# Patient Record
Sex: FEMALE | Race: WHITE | URBAN - METROPOLITAN AREA
[De-identification: names, ages, dates, MRNs, and addresses within clinical notes are randomized per-mention and may not be internally consistent; named-entity substitution may affect disease eponyms.]

---

## 2019-01-30 ENCOUNTER — OUTPATIENT (OUTPATIENT)
Dept: OUTPATIENT SERVICES | Facility: HOSPITAL | Age: 79
LOS: 1 days | End: 2019-01-30
Payer: MEDICARE

## 2019-01-30 VITALS
TEMPERATURE: 98 F | HEART RATE: 64 BPM | WEIGHT: 197.98 LBS | RESPIRATION RATE: 18 BRPM | DIASTOLIC BLOOD PRESSURE: 66 MMHG | HEIGHT: 63 IN | SYSTOLIC BLOOD PRESSURE: 130 MMHG

## 2019-01-30 DIAGNOSIS — Z98.890 OTHER SPECIFIED POSTPROCEDURAL STATES: Chronic | ICD-10-CM

## 2019-01-30 DIAGNOSIS — Z90.49 ACQUIRED ABSENCE OF OTHER SPECIFIED PARTS OF DIGESTIVE TRACT: Chronic | ICD-10-CM

## 2019-01-30 DIAGNOSIS — N85.9 NONINFLAMMATORY DISORDER OF UTERUS, UNSPECIFIED: ICD-10-CM

## 2019-01-30 DIAGNOSIS — H40.9 UNSPECIFIED GLAUCOMA: ICD-10-CM

## 2019-01-30 LAB
ANION GAP SERPL CALC-SCNC: 12 MMO/L — SIGNIFICANT CHANGE UP (ref 7–14)
BASOPHILS # BLD AUTO: 0.05 K/UL — SIGNIFICANT CHANGE UP (ref 0–0.2)
BASOPHILS NFR BLD AUTO: 0.7 % — SIGNIFICANT CHANGE UP (ref 0–2)
BUN SERPL-MCNC: 21 MG/DL — SIGNIFICANT CHANGE UP (ref 7–23)
CALCIUM SERPL-MCNC: 9.2 MG/DL — SIGNIFICANT CHANGE UP (ref 8.4–10.5)
CHLORIDE SERPL-SCNC: 103 MMOL/L — SIGNIFICANT CHANGE UP (ref 98–107)
CO2 SERPL-SCNC: 27 MMOL/L — SIGNIFICANT CHANGE UP (ref 22–31)
CREAT SERPL-MCNC: 0.92 MG/DL — SIGNIFICANT CHANGE UP (ref 0.5–1.3)
EOSINOPHIL # BLD AUTO: 0.3 K/UL — SIGNIFICANT CHANGE UP (ref 0–0.5)
EOSINOPHIL NFR BLD AUTO: 4 % — SIGNIFICANT CHANGE UP (ref 0–6)
GLUCOSE SERPL-MCNC: 79 MG/DL — SIGNIFICANT CHANGE UP (ref 70–99)
HCT VFR BLD CALC: 43.1 % — SIGNIFICANT CHANGE UP (ref 34.5–45)
HGB BLD-MCNC: 13.7 G/DL — SIGNIFICANT CHANGE UP (ref 11.5–15.5)
IMM GRANULOCYTES NFR BLD AUTO: 0.5 % — SIGNIFICANT CHANGE UP (ref 0–1.5)
LYMPHOCYTES # BLD AUTO: 1.65 K/UL — SIGNIFICANT CHANGE UP (ref 1–3.3)
LYMPHOCYTES # BLD AUTO: 21.8 % — SIGNIFICANT CHANGE UP (ref 13–44)
MCHC RBC-ENTMCNC: 29.5 PG — SIGNIFICANT CHANGE UP (ref 27–34)
MCHC RBC-ENTMCNC: 31.8 % — LOW (ref 32–36)
MCV RBC AUTO: 92.7 FL — SIGNIFICANT CHANGE UP (ref 80–100)
MONOCYTES # BLD AUTO: 0.63 K/UL — SIGNIFICANT CHANGE UP (ref 0–0.9)
MONOCYTES NFR BLD AUTO: 8.3 % — SIGNIFICANT CHANGE UP (ref 2–14)
NEUTROPHILS # BLD AUTO: 4.9 K/UL — SIGNIFICANT CHANGE UP (ref 1.8–7.4)
NEUTROPHILS NFR BLD AUTO: 64.7 % — SIGNIFICANT CHANGE UP (ref 43–77)
NRBC # FLD: 0 K/UL — LOW (ref 25–125)
PLATELET # BLD AUTO: 222 K/UL — SIGNIFICANT CHANGE UP (ref 150–400)
PMV BLD: 13 FL — SIGNIFICANT CHANGE UP (ref 7–13)
POTASSIUM SERPL-MCNC: 4.1 MMOL/L — SIGNIFICANT CHANGE UP (ref 3.5–5.3)
POTASSIUM SERPL-SCNC: 4.1 MMOL/L — SIGNIFICANT CHANGE UP (ref 3.5–5.3)
RBC # BLD: 4.65 M/UL — SIGNIFICANT CHANGE UP (ref 3.8–5.2)
RBC # FLD: 12.1 % — SIGNIFICANT CHANGE UP (ref 10.3–14.5)
SODIUM SERPL-SCNC: 142 MMOL/L — SIGNIFICANT CHANGE UP (ref 135–145)
WBC # BLD: 7.57 K/UL — SIGNIFICANT CHANGE UP (ref 3.8–10.5)
WBC # FLD AUTO: 7.57 K/UL — SIGNIFICANT CHANGE UP (ref 3.8–10.5)

## 2019-01-30 PROCEDURE — 93010 ELECTROCARDIOGRAM REPORT: CPT

## 2019-01-30 NOTE — H&P PST ADULT - NSANTHOSAYNRD_GEN_A_CORE
No. TIMOTEO screening performed.  STOP BANG Legend: 0-2 = LOW Risk; 3-4 = INTERMEDIATE Risk; 5-8 = HIGH Risk

## 2019-01-30 NOTE — H&P PST ADULT - RS GEN PE MLT RESP DETAILS PC
breath sounds equal/respirations non-labored/good air movement/clear to auscultation bilaterally/airway patent/no wheezes

## 2019-01-30 NOTE — H&P PST ADULT - PROBLEM SELECTOR PLAN 1
scheduled for dilation curettage hysteroscopy, polypectomy on 02/13/2019.   Pre-Op instructions provided to patient.   Pt. will take her own Pantoprazole day of surgery.  Patient will obtain medical clearance as per surgeons request-copy requested

## 2019-01-30 NOTE — H&P PST ADULT - HISTORY OF PRESENT ILLNESS
77 yo female presents to PST unit with pre-op diagnosis of noninflammatory disorder of uterus scheduled for dilation curettage hysteroscopy, polypectomy on 02/13/2019. She states there was fluid in her uterus on office Sono.

## 2019-01-30 NOTE — H&P PST ADULT - ASSESSMENT
79 yo female presents to PST unit with pre-op diagnosis of noninflammatory disorder of uterus scheduled for dilation curettage hysteroscopy, polypectomy on 02/13/2019. She states there was fluid in her uterus on office Sono.

## 2019-02-12 ENCOUNTER — TRANSCRIPTION ENCOUNTER (OUTPATIENT)
Age: 79
End: 2019-02-12

## 2019-02-12 NOTE — ASU PATIENT PROFILE, ADULT - ATTEMPT TO OOB
47 y.o. female presents to ER ED 05/ED 05   Chief Complaint   Patient presents with    Flank Pain     left   . No acute distress noted.  Dr. Alejandro is at the bedside.   no

## 2019-02-13 ENCOUNTER — RESULT REVIEW (OUTPATIENT)
Age: 79
End: 2019-02-13

## 2019-02-13 ENCOUNTER — OUTPATIENT (OUTPATIENT)
Dept: OUTPATIENT SERVICES | Facility: HOSPITAL | Age: 79
LOS: 1 days | Discharge: HOME | End: 2019-02-13
Payer: MEDICARE

## 2019-02-13 VITALS
TEMPERATURE: 98 F | SYSTOLIC BLOOD PRESSURE: 151 MMHG | WEIGHT: 197.98 LBS | HEIGHT: 63 IN | RESPIRATION RATE: 18 BRPM | HEART RATE: 75 BPM | OXYGEN SATURATION: 95 % | DIASTOLIC BLOOD PRESSURE: 59 MMHG

## 2019-02-13 VITALS
OXYGEN SATURATION: 98 % | DIASTOLIC BLOOD PRESSURE: 60 MMHG | SYSTOLIC BLOOD PRESSURE: 128 MMHG | RESPIRATION RATE: 18 BRPM | HEART RATE: 68 BPM

## 2019-02-13 DIAGNOSIS — Z90.49 ACQUIRED ABSENCE OF OTHER SPECIFIED PARTS OF DIGESTIVE TRACT: Chronic | ICD-10-CM

## 2019-02-13 DIAGNOSIS — N85.9 NONINFLAMMATORY DISORDER OF UTERUS, UNSPECIFIED: ICD-10-CM

## 2019-02-13 DIAGNOSIS — Z98.890 OTHER SPECIFIED POSTPROCEDURAL STATES: Chronic | ICD-10-CM

## 2019-02-13 PROCEDURE — 88112 CYTOPATH CELL ENHANCE TECH: CPT | Mod: 26

## 2019-02-13 PROCEDURE — 88305 TISSUE EXAM BY PATHOLOGIST: CPT | Mod: 26

## 2019-02-13 PROCEDURE — 88305 TISSUE EXAM BY PATHOLOGIST: CPT | Mod: 26,59

## 2019-02-13 RX ORDER — FENOFIBRATE,MICRONIZED 130 MG
1 CAPSULE ORAL
Qty: 0 | Refills: 0 | COMMUNITY

## 2019-02-13 RX ORDER — PANTOPRAZOLE SODIUM 20 MG/1
1 TABLET, DELAYED RELEASE ORAL
Qty: 0 | Refills: 0 | COMMUNITY

## 2019-02-13 RX ORDER — MELOXICAM 15 MG/1
1 TABLET ORAL
Qty: 0 | Refills: 0 | COMMUNITY

## 2019-02-13 RX ORDER — ASPIRIN/CALCIUM CARB/MAGNESIUM 324 MG
1 TABLET ORAL
Qty: 0 | Refills: 0 | COMMUNITY

## 2019-02-13 RX ORDER — LATANOPROST 0.05 MG/ML
1 SOLUTION/ DROPS OPHTHALMIC; TOPICAL
Qty: 0 | Refills: 0 | COMMUNITY

## 2019-02-13 RX ORDER — SODIUM CHLORIDE 9 MG/ML
1000 INJECTION, SOLUTION INTRAVENOUS
Qty: 0 | Refills: 0 | Status: DISCONTINUED | OUTPATIENT
Start: 2019-02-13 | End: 2019-02-13

## 2019-02-13 RX ORDER — ATORVASTATIN CALCIUM 80 MG/1
1 TABLET, FILM COATED ORAL
Qty: 0 | Refills: 0 | COMMUNITY

## 2019-02-13 RX ORDER — TIMOLOL 0.5 %
1 DROPS OPHTHALMIC (EYE)
Qty: 0 | Refills: 0 | COMMUNITY

## 2019-02-13 RX ADMIN — SODIUM CHLORIDE 30 MILLILITER(S): 9 INJECTION, SOLUTION INTRAVENOUS at 08:19

## 2019-02-13 NOTE — ASU DISCHARGE PLAN (ADULT/PEDIATRIC). - NOTIFY
Bleeding that does not stop/GYN Fever>100.4 Pain not relieved by Medications/Bleeding that does not stop/Persistent Nausea and Vomiting/GYN Fever>100.4

## 2019-02-13 NOTE — BRIEF OPERATIVE NOTE - POST-OP DX
Fluid in endometrial cavity  02/13/2019    Julia Márquez  Stenosis of cervix  02/13/2019    Julia Márquez

## 2019-02-13 NOTE — BRIEF OPERATIVE NOTE - PROCEDURE
<<-----Click on this checkbox to enter Procedure Hysteroscopy with biopsy, with dilation and curettage of uterus if indicated  02/13/2019    Active  [facility]  Dilation and curettage of uterus  02/13/2019    Active  [facility]

## 2019-02-13 NOTE — ASU DISCHARGE PLAN (ADULT/PEDIATRIC). - MEDICATION SUMMARY - MEDICATIONS TO TAKE
I will START or STAY ON the medications listed below when I get home from the hospital:    aspirin 81 mg oral tablet  -- 1 tab(s) by mouth once a day - last dose 2/6/19  -- Indication: For pain    meloxicam 7.5 mg oral tablet  -- 1 tab(s) by mouth once a day  -- Indication: For pain    fenofibrate 54 mg oral tablet  -- 1 tab(s) by mouth once a day  -- Indication: For cholesterol    atorvastatin 20 mg oral tablet  -- 1 tab(s) by mouth once a day  -- Indication: For cholesterol    timolol hemihydrate 0.5% ophthalmic solution  -- 1 drop(s) to each affected eye 2 times a day  -- Indication: For eye drops    latanoprost 0.005% ophthalmic solution  -- 1 drop(s) to each affected eye once a day (in the evening)  -- Indication: For eye drops    pantoprazole 40 mg oral delayed release tablet  -- 1 tab(s) by mouth once a day  -- Indication: For acid blocker

## 2019-02-13 NOTE — ASU DISCHARGE PLAN (ADULT/PEDIATRIC). - NURSING INSTRUCTIONS
You were given Tylenol in the operating room, so you may not take any Tylenol product until ____3: 25pm_______   You were given Motrin  in the operating room, so you may not take any Motrin product until ____3: 45pm_______

## 2019-02-19 LAB — SURGICAL PATHOLOGY STUDY: SIGNIFICANT CHANGE UP

## 2019-02-20 LAB — NON-GYNECOLOGICAL CYTOLOGY STUDY: SIGNIFICANT CHANGE UP

## 2019-03-12 ENCOUNTER — EMERGENCY (EMERGENCY)
Facility: HOSPITAL | Age: 79
LOS: 1 days | Discharge: HOME | End: 2019-03-12
Attending: EMERGENCY MEDICINE | Admitting: EMERGENCY MEDICINE
Payer: MEDICARE

## 2019-03-12 VITALS
SYSTOLIC BLOOD PRESSURE: 146 MMHG | RESPIRATION RATE: 18 BRPM | HEART RATE: 64 BPM | OXYGEN SATURATION: 100 % | DIASTOLIC BLOOD PRESSURE: 44 MMHG

## 2019-03-12 VITALS
SYSTOLIC BLOOD PRESSURE: 150 MMHG | OXYGEN SATURATION: 99 % | RESPIRATION RATE: 16 BRPM | HEART RATE: 70 BPM | DIASTOLIC BLOOD PRESSURE: 66 MMHG | TEMPERATURE: 98 F

## 2019-03-12 DIAGNOSIS — Z98.890 OTHER SPECIFIED POSTPROCEDURAL STATES: Chronic | ICD-10-CM

## 2019-03-12 DIAGNOSIS — Z90.49 ACQUIRED ABSENCE OF OTHER SPECIFIED PARTS OF DIGESTIVE TRACT: Chronic | ICD-10-CM

## 2019-03-12 PROBLEM — K76.0 FATTY (CHANGE OF) LIVER, NOT ELSEWHERE CLASSIFIED: Chronic | Status: ACTIVE | Noted: 2019-01-30

## 2019-03-12 PROBLEM — N85.9 NONINFLAMMATORY DISORDER OF UTERUS, UNSPECIFIED: Chronic | Status: ACTIVE | Noted: 2019-01-30

## 2019-03-12 PROCEDURE — 73130 X-RAY EXAM OF HAND: CPT | Mod: 26,LT

## 2019-03-12 PROCEDURE — 99284 EMERGENCY DEPT VISIT MOD MDM: CPT

## 2019-03-12 PROCEDURE — 70486 CT MAXILLOFACIAL W/O DYE: CPT | Mod: 26

## 2019-03-12 PROCEDURE — 70450 CT HEAD/BRAIN W/O DYE: CPT | Mod: 26

## 2019-03-12 PROCEDURE — 72125 CT NECK SPINE W/O DYE: CPT | Mod: 26

## 2019-03-12 RX ORDER — ACETAMINOPHEN 500 MG
650 TABLET ORAL ONCE
Qty: 0 | Refills: 0 | Status: COMPLETED | OUTPATIENT
Start: 2019-03-12 | End: 2019-03-12

## 2019-03-12 RX ORDER — ACETAZOLAMIDE 250 MG/1
500 TABLET ORAL ONCE
Qty: 0 | Refills: 0 | Status: DISCONTINUED | OUTPATIENT
Start: 2019-03-12 | End: 2019-03-12

## 2019-03-12 RX ORDER — ACETAZOLAMIDE 250 MG/1
250 TABLET ORAL ONCE
Qty: 0 | Refills: 0 | Status: DISCONTINUED | OUTPATIENT
Start: 2019-03-12 | End: 2019-03-12

## 2019-03-12 RX ORDER — ACETAZOLAMIDE 250 MG/1
500 TABLET ORAL ONCE
Qty: 0 | Refills: 0 | Status: COMPLETED | OUTPATIENT
Start: 2019-03-12 | End: 2019-03-12

## 2019-03-12 RX ORDER — ACETAZOLAMIDE 250 MG/1
250 TABLET ORAL ONCE
Qty: 0 | Refills: 0 | Status: COMPLETED | OUTPATIENT
Start: 2019-03-12 | End: 2019-03-12

## 2019-03-12 RX ADMIN — Medication 650 MILLIGRAM(S): at 12:56

## 2019-03-12 RX ADMIN — ACETAZOLAMIDE 500 MILLIGRAM(S): 250 TABLET ORAL at 19:05

## 2019-03-12 RX ADMIN — ACETAZOLAMIDE 250 MILLIGRAM(S): 250 TABLET ORAL at 23:35

## 2019-03-12 RX ADMIN — Medication 650 MILLIGRAM(S): at 18:31

## 2019-03-12 NOTE — ED PROVIDER NOTE - PLAN OF CARE
Seen in ER for fall and left around the eye trauma. CAT scan of head shows no brain bleed. CAT scan of neck shows "non specific small rounded lucency left aspect of c7 vertebral body," per the Radiologist you have an unclear spot in the 7th bone of your neck, recommend MRI through your primary doctor to further classify what this spot is.     per omfs-No surgical intervention indicated at this time  - Augmentin 875 BID/7 days  - Sinus precautions (no nose blowing)  - Followup in 7-10 days in OMFS clinic. Please call [phone] to make appointment.   -pending optho consult Seen in ER for fall resulting in fracture of the wall around your left eye. (1) CAT scan of head shows no brain bleed. (2) CAT scan of neck shows "non specific small rounded lucency left aspect of c7 vertebral body," per the Radiologist you have an unclear spot in the 7th bone of your neck, recommend MRI through your primary doctor to further classify what this spot is. (3) For the left eye wall fracture the Facial Bone Doctor (Oral Maxillofacial Surgeon) says no surgery at this time, take Augmentin 875/125 mg every 12 hours for 7 days, no nose blowing, no closed mouth sneeze, no straws (these can make more issues with your fractured area), and call [phone] to see Oral Maxillofacial Surgery clinic in 1 week. (4) For the left eye wall fracture the Ophthamologist asks that you call [phone] to make an appointment with Eye Clinic [address] [place] [address] [address] [place], Prednisolone Acetate 1% eye drop every 3 hours while awake until eye doctor says to stop, Cosopt Seen in ER for fall resulting in fracture of the wall around your left eye. (1) CAT scan of head shows no brain bleed. (2) CAT scan of neck shows "non specific small rounded lucency left aspect of c7 vertebral body," per the Radiologist you have an unclear spot in the 7th bone of your neck, recommend MRI through your primary doctor to further classify what this spot is. (3) For the left eye wall fracture the Facial Bone Doctor (Oral Maxillofacial Surgeon) says no surgery at this time, take Augmentin 875/125 mg every 12 hours for 7 days, no nose blowing, no closed mouth sneeze, no straws (these can make more issues with your fractured area), and call [phone] to see Oral Maxillofacial Surgery clinic in 1 week. (4) For the left eye wall fracture the Ophthamologist asks that you call [phone] to make an appointment with [facility] Clinic [address] [place] [address] [address] [place], Prednisolone Acetate 1% eye drop both eyes every 3 hours while awake until eye doctor says to stop, Cosopt right eye every 12 hours until eye doctor says to stop, Brimonidine 0.2% every 8 hours in right eye every 8 hours until eye doctor says to stop, latanoprost every right eye at bedtime until eye doctor says to stop, Medrol Dose Tomasz use as directed, Diamox 250 mg every 6 hours for 7 days, and an eye patch from the pharmacy. Return to ER for any new or worsening symptoms.

## 2019-03-12 NOTE — ED PROVIDER NOTE - CARE PLAN
Principal Discharge DX:	Facial trauma, initial encounter Principal Discharge DX:	Facial trauma, initial encounter  Assessment and plan of treatment:	Seen in ER for fall and left around the eye trauma. CAT scan of head shows no brain bleed. CAT scan of neck shows "non specific small rounded lucency left aspect of c7 vertebral body," per the Radiologist you have an unclear spot in the 7th bone of your neck, recommend MRI through your primary doctor to further classify what this spot is.     per omfs-No surgical intervention indicated at this time  - Augmentin 875 BID/7 days  - Sinus precautions (no nose blowing)  - Followup in 7-10 days in OMFS clinic. Please call [phone] to make appointment.   -pending optho consult Principal Discharge DX:	Orbital wall fracture, closed, initial encounter  Assessment and plan of treatment:	Seen in ER for fall resulting in fracture of the wall around your left eye. (1) CAT scan of head shows no brain bleed. (2) CAT scan of neck shows "non specific small rounded lucency left aspect of c7 vertebral body," per the Radiologist you have an unclear spot in the 7th bone of your neck, recommend MRI through your primary doctor to further classify what this spot is. (3) For the left eye wall fracture the Facial Bone Doctor (Oral Maxillofacial Surgeon) says no surgery at this time, take Augmentin 875/125 mg every 12 hours for 7 days, no nose blowing, no closed mouth sneeze, no straws (these can make more issues with your fractured area), and call [phone] to see Oral Maxillofacial Surgery clinic in 1 week. (4) For the left eye wall fracture the Ophthamologist asks that you call [phone] to make an appointment with Eye Clinic [address] [place] [address] [address] [place], Prednisolone Acetate 1% eye drop every 3 hours while awake until eye doctor says to stop, Cosopt Principal Discharge DX:	Orbital wall fracture, closed, initial encounter  Assessment and plan of treatment:	Seen in ER for fall resulting in fracture of the wall around your left eye. (1) CAT scan of head shows no brain bleed. (2) CAT scan of neck shows "non specific small rounded lucency left aspect of c7 vertebral body," per the Radiologist you have an unclear spot in the 7th bone of your neck, recommend MRI through your primary doctor to further classify what this spot is. (3) For the left eye wall fracture the Facial Bone Doctor (Oral Maxillofacial Surgeon) says no surgery at this time, take Augmentin 875/125 mg every 12 hours for 7 days, no nose blowing, no closed mouth sneeze, no straws (these can make more issues with your fractured area), and call [phone] to see Oral Maxillofacial Surgery clinic in 1 week. (4) For the left eye wall fracture the Ophthamologist asks that you call [phone] to make an appointment with Eye Clinic [address] [place] [address], Prednisolone Acetate 1% eye drop both eyes every 3 hours while awake until eye doctor says to stop, Cosopt right eye every 12 hours until eye doctor says to stop, Brimonidine 0.2% every 8 hours in right eye every 8 hours until eye doctor says to stop, latanoprost every right eye at bedtime until eye doctor says to stop, Medrol Dose Tomasz use as directed, Diamox 250 mg every 6 hours for 7 days, and an eye patch from the pharmacy. Return to ER for any new or worsening symptoms.

## 2019-03-12 NOTE — ED PROVIDER NOTE - NOTES
will see patient shortly is at [facility] for consults, will be able to get to hospital in approx 2 hours. Patient aware and ok to wait.

## 2019-03-12 NOTE — ED PROVIDER NOTE - NSFOLLOWUPINSTRUCTIONS_ED_ALL_ED_FT
Seen in ER for fall resulting in fracture of the wall around your left eye. (1) CAT scan of head shows no brain bleed. (2) CAT scan of neck shows "non specific small rounded lucency left aspect of c7 vertebral body," per the Radiologist you have an unclear spot in the 7th bone of your neck, recommend MRI through your primary doctor to further classify what this spot is. (3) For the left eye wall fracture the Facial Bone Doctor (Oral Maxillofacial Surgeon) says no surgery at this time, take Augmentin 875/125 mg every 12 hours for 7 days, no nose blowing, no closed mouth sneeze, no straws (these can make more issues with your fractured area), and call [phone] to see Oral Maxillofacial Surgery clinic in 1 week. (4) For the left eye wall fracture the Ophthamologist asks that you call [phone] to make an appointment with Eye Clinic [address] [place] [address] [address] [place], Prednisolone Acetate 1% eye drop both eyes every 3 hours while awake until eye doctor says to stop, Cosopt right eye every 12 hours until eye doctor says to stop, Brimonidine 0.2% every 8 hours in right eye every 8 hours until eye doctor says to stop, latanoprost every right eye at bedtime until eye doctor says to stop, Medrol Dose Tomasz use as directed, Diamox 250 mg every 6 hours for 7 days, and an eye patch from the pharmacy. Return to ER for any new or worsening symptoms.

## 2019-03-12 NOTE — ED PROVIDER NOTE - CLINICAL SUMMARY MEDICAL DECISION MAKING FREE TEXT BOX
facial trauma a/p fall primarily to left superior periorbital region, normal eye exam, well appearing, neuro intact, on asa, will check ct head to r/o ich, ct max/face/orbit to eval for facial/eye trauma, ct c-spine for spinal trauma, xr hand to eval for fx, pain ctrl, reass.

## 2019-03-12 NOTE — ED ADULT TRIAGE NOTE - CHIEF COMPLAINT QUOTE
Patient tripped and fell this morning hitting her face on the ground, left eye swelling and redness noted.  no loc. Takes asa daily. Icelandic speaking , ems translating. NO neck or back pain.

## 2019-03-12 NOTE — ED PROVIDER NOTE - PHYSICAL EXAMINATION
GEN - NAD; well appearing; A+O x3   HEAD - NC/AT   face-+left superior periorbital and eyelid edema, ecchymosis, able to open eye, no crepitus or obvious bony deformity, mild ttp to region, remainder of facial exam normal.  EYES- PERRL, EOMI  ENT: Airway patent, mmm, Oral cavity and pharynx normal. No inflammation, swelling, exudate, or lesions.    NECK: Neck supple, non-tender midline, from  PULMONARY - CTA b/l, symmetric breath sounds.   CARDIAC -s1s2, RRR, no M,G,R  ABDOMEN - +BS, ND, NT, soft, no guarding, no rebound, no masses   BACK - no CVA tenderness, Normal  spine   EXTREMITIES - left hand with bruising to palm, no bony ttp, FROM, symmetric pulses, capillary refill < 2 seconds, no edema   SKIN - bruising to left periorbital region, abrasion to left chin region, no fb, no bleeding  NEUROLOGIC - alert, speech clear, 5/5 strength diffusely, sensation grossly intact.  PSYCH -nl mood/affect, nl insight. GEN - NAD; well appearing; A+O x3   HEAD - NC/AT   face-+left superior periorbital and eyelid edema, ecchymosis, able to open eye, no crepitus or obvious bony deformity, mild ttp to region, remainder of facial exam normal.  EYES- r. eye 20/20 uncorrected, left eye 20/40, pupils 2mm, sluggishly reactive b/l, EOM fully intact.   ENT: Airway patent, mmm, Oral cavity and pharynx normal. No inflammation, swelling, exudate, or lesions.    NECK: Neck supple, non-tender midline, from  PULMONARY - CTA b/l, symmetric breath sounds.   CARDIAC -s1s2, RRR, no M,G,R  ABDOMEN - +BS, ND, NT, soft, no guarding, no rebound, no masses   BACK - no CVA tenderness, Normal  spine   EXTREMITIES - left hand with bruising to palm, no bony ttp, FROM, symmetric pulses, capillary refill < 2 seconds, no edema   SKIN - bruising to left periorbital region, abrasion to left chin region, no fb, no bleeding  NEUROLOGIC - alert, speech clear, 5/5 strength diffusely, sensation grossly intact.  PSYCH -nl mood/affect, nl insight.

## 2019-03-12 NOTE — CONSULT NOTE ADULT - SUBJECTIVE AND OBJECTIVE BOX
HPI:  79 y/o f presents s/p mechanical trip and fall. Patient was going for a walk outside, tripped over uneven ground, fell forward, landed on her face, left side, no loc, able to ambulate afterward, occurred this morning. Since then has noted swelling and mild constant pain to upper eyelid and superior periorbital region. No ha, neck pain, vision changes, confusion, weakness, numbness, imbalance, nausea, vomiting.     PMH: healthy  PSH: denies  Meds: denies  SH: denies  All: NKDA    Vital Signs Last 24 Hrs  T(C): 36.8 (12 Mar 2019 10:30), Max: 36.8 (12 Mar 2019 10:30)  T(F): 98.2 (12 Mar 2019 10:30), Max: 98.2 (12 Mar 2019 10:30)  HR: 70 (12 Mar 2019 10:30) (70 - 70)  BP: 150/66 (12 Mar 2019 10:30) (150/66 - 150/66)  BP(mean): --  RR: 16 (12 Mar 2019 10:30) (16 - 16)  SpO2: 99% (12 Mar 2019 10:30) (99% - 99%)    Gen: NAD, AAOx3  E: L periorbital edema, PERRL, EOMI, no proptosis, no obvious signs of corneal abrasion, no diplopia, no changes in vision, mild pain upon leftward movement  N: nares clear b/l, no septal hematoma  EOE: no LAD, no extraoral edema, no signs of lacerations  IOE: generalized good OH, no dental trauma, occlusion stable and reproducible, gingiva clean, no signs of lacerations intraorally    Radiographic findings: (CT Max/Face without contrast) An acute fracture involves the posterior aspect of the medial wall of the left orbit. The medial rectus muscle is noted entering the fracture defect and appears deformed; correlate for possible associated extraocular muscle entrapment. Herniation of orbital fat into the defect is noted. Pre and post septal air is noted. Slight left-sided proptosis is noted. There is no retrobulbar hematoma. There is no gross evidence for globe rupture or radiopaque orbital foreign body.    A/P: 78y Female s/p L medial orbital wall fracture. No entrapment, diplopia, changes in vision. Patient stable and hemostatic.   - No surgical intervention indicated at this time  - Augmentin 875 BID/7 days  - Sinus precautions (no nose blowing)  - Followup in 7-10 days in [facility]. Please call [phone] to make appointment.

## 2019-03-12 NOTE — CONSULT NOTE ADULT - SUBJECTIVE AND OBJECTIVE BOX
[facility] Consult Note    HPI:  78 F with history of glaucoma, HLD s/p mechanical fall found to have left medial wall fracture. Patient endorsing left eye pain at rest and with eye movements. She does not report a change in vision or double vision.     PMH: HLD  POcHx (including surgeries/lasers/trauma):  glaucoma  Drops: latanaprost, timolol  FamHx: None  Social Hx: None  Allergies: NKDA    ROS:  General (neg), Vision (per HPI), Head and Neck (neg), Pulm (neg), CV (neg), GI (neg),  (neg), Musculoskeletal (neg), Skin/Integ (neg), Neuro (neg), Endocrine (neg), Heme (neg), All/Immuno (neg)    Mood and Affect Appropriate ( x ),  Oriented to Time, Place, and Person x 3 ( x )    Ophthalmology Exam    Visual acuity (cc): 20/25 OD 20/40 OS   Pupils: R&R OD, mid-dilated to about 5 mm OS, no APD   Ttono: 18  OD 45 (s/p Diamox 500 mg IV x 1, 250 mg IV x 1, Xalatan x 4, Cosopt x 4, Brimonidine x 4)  Extraocular movements (EOMs): Full OU, no diplopia,  (+) pain with EOMS  Confrontational Visual Field (CVF):  Full OU  Color Plates: 11/12 OU    Slit Lamp Exam (SLE)  External:  Flat OD 2+ periorbital edema/ecchymoses OS  no obvious proptosis  Lids/Lashes/Lacrimal Ducts: Flat OD 2+ edema   Sclera/Conjunctiva:  W+Q OD 1+ injection, subconjunctival hemorrhage  Cornea: Cl OU  Anterior Chamber: D+Q OD 3+ RBCs, no layered hyphema, shallow chamber (deepened after administration of ocular hypotensives) OS    Iris:  Flat OU  Lens:  NS OU    Gonioscopy  OD open to bare   OS sampaolaesi line inferiorly, bare TM < 180 degrees    Fundus Exam: dilated with 1% tropicamide and 2.5% phenylephrine  Approval obtained from primary team for dilation  Patient aware that pupils can remained dilated for at least 4-6 hours  Exam performed with 20D lens    OS dilated deferred in setting of narrow angle, possible angle closure    Vitreous: wnl OU  Disc, cup/disc: sharp and pink, 0.7 OU  Macula:  wnl OU  Vessels:  wnl OU  Periphery: wnl OU    CT:   IMPRESSION:     Head CT:     No evidence for calvarial fracture or acute intracranial hemorrhage.     Maxillofacial CT:     An acute fracture involves the posterior aspect of the medial wall of the   left orbit. The medial rectus muscle is noted entering the fracture defect   and appears deformed; correlate for possible associated extraocular muscle   entrapment. Herniation of orbital fat into the defect is noted. Pre and post   septal air is noted. Slight left-sided proptosis is noted. There is no   retrobulbar hematoma.     Cervical spine CT:     No evidence for acute fracture or subluxation.     Nonspecific left thyroid nodule, partially visualized.     Nonspecific small rounded lucency left aspect of the C7 vertebral body.       Assessment:  78 F s/p mechanical fall found to have medial wall fracture as well as microhyphema OS with severe IOP spike. Left eye was somewhat suspicious for angle closure as AC was shallow, however was not complete closure as areas of bare TM were noted. IOP reduced to 30 mg after administration of gtts, IV diamox. Needs urgent ophthalmology follow up tomorrow for repeat IOP check and potential laser peripheral iridotomy (even if not angle closure, angles very narrow OU). Although EOMs full, medial rectus is entering fracture defect, although does not appear entrapped    Plan:  - c/w Xalatan qhs OU  - Cosopt BID OS, Brimonidine TID OS   - Pred Forte Q3h OS   - Erythromycin ointment at bed time   - will defer cyclogyl as angles very narrow  - place eye shield over left eye  - start Diamox 250 mg QID starting tomorrow   - Bedrest with bathroom privileges  - head of bed elevated    - ice packs to left eye  - nasal decongestants BID  - no nose blowing, sneezing, bending over, heavy lifting  - start Medrol Dose Pack  - possible LPI tomorrow    Follow-Up:  Patient should follow up his/her ophthalmologist (Dr. Anthony Cutler) or in the [facility] tomorrow.   [address] [facility]  [place] [address]  [phone]    Case D/W Dr. Chavira

## 2019-03-12 NOTE — ED PROVIDER NOTE - SHIFT CHANGE DETAILS
Patient being actively managed and monitored by opthalmology who is present in ed, multiple drop meds have been applied by them per their note. F/u their recs, if patient to be d/c, d/c recs per optho and omfs and to f/u with them.

## 2019-03-12 NOTE — ED ADULT NURSE NOTE - OBJECTIVE STATEMENT
dorota RN: received pt to INT 13, Patient tripped and fell this morning hitting her face on the ground, left eye swelling and redness noted.  no loc. Takes asa daily. Wolof speaking , ems translating. NO neck or back pain, pt A&Ox3, IV access lft hand 22g, no labs ordered, pt medicated as ordered, placed on stretcher near nurses station and on monitor for frequent monitoring as per MD request, report given to primary RN Victorina.

## 2019-03-12 NOTE — ED PROVIDER NOTE - OBJECTIVE STATEMENT
77 y/o f presents s/p mechanical trip and fall. Patient was going for a walk outside, tripped over uneven ground, fell forward, landed on her face, left side, no loc, able to ambulate afterward, occurred this morning. Since then has noted swelling and mild constant pain to upper eyelid and superior periorbital region. No ha, neck pain, vision changes, confusion, weakness, numbness, imbalance, nausea, vomiting. Notes mild left hand pain/bruising to palm of hand where she tried to break her fall. tetanus utd

## 2019-03-12 NOTE — ED ADULT NURSE NOTE - CHIEF COMPLAINT QUOTE
Patient tripped and fell this morning hitting her face on the ground, left eye swelling and redness noted.  no loc. Takes asa daily. Setswana speaking , ems translating. NO neck or back pain.

## 2019-03-12 NOTE — ED PROVIDER NOTE - NS ED ROS FT
ROS:  GENERAL: No fever, no chills  EYES: no change in vision  HEENT: no trouble swallowing, no trouble speaking  CARDIAC: no chest pain  PULMONARY: no cough, no shortness of breath  GI: no abdominal pain, no nausea, no vomiting, no diarrhea, no constipation  : No dysuria, no frequency, no change in appearance, or odor of urine  SKIN: +bruising/abrasion to left superior periorbital region, upper eyelid.   NEURO: no headache, no weakness  MSK: +left hand pain, No joint pain

## 2019-03-12 NOTE — ED PROVIDER NOTE - PROGRESS NOTE DETAILS
results reviewed, omfs/optho consulted per omfs-No surgical intervention indicated at this time  - Augmentin 875 BID/7 days  - Sinus precautions (no nose blowing)  - Followup in 7-10 days in OMFS clinic. Please call [phone] to make appointment.   -pending optho consult [place] att: 21:00-22:00 Notified by RN Bjorn RUIZ patient repeatedly threatening to leave. Spoke with family member, patient frustrated that the plan changes so frequently, where is the diamox. Explained to patient that the ophtho plan frequently evolves, as of now diamox cancelled, they will return. 22:10 Following additional drops, ophtho got repeat IOP in 20s. Ophtho to write discharge meds in their consult. 22:30 Dispo still pending reccs. results reviewed, omfs/optho consulted, discussed results with patient and daughter including incidental findings that she must f/u as outpatient. [place] att: 20:00 Patient signed out to me, left orbital medial wall trauma, seen by OMFS, dispo pending Ophtho eval. 21:00 Patient seen by Ophtho resident juan luis and senior. 21:00-22:00 9P First notified by RN Bjorn RUIZ patient's daughter threatening to leave with patient. I spoke with family member several times, appreciate that daughter is frustrated that the Ophthalmologist plan changes so frequently, where is the diamox, when are they going home. Explained to patient that the ophtho plan frequently evolves, as of now diamox cancelled, they will return. If patient leaves without reducing the pressure, she could permamently lose vision in her left eye. Daughter amenable to waiting. 22:10 Following additional drops, ophtho got repeat IOP in 20s. Ophtho to write discharge meds in their consult. 22:30 Dispo still pending reccs. 22:40 Daughter approaching desk frequently while providering preparing the dc papers, upset that Ophtho said they can go, where are the papers. Explained to the daughter that I am preparing the papers but due to both consultations recommendations, 6-7 prescriptions, varying concentrations, and time needed to clarify which drops are both eyes and which are left eye only, dc papers will take some time. Explained to patient's daughter the importance of clear instructions and return precautions. I reviewed the below dc instructions with the family and sent the medications to the preferred pharmacy. [place] att: 20:00 Patient signed out to me, left orbital medial wall trauma, seen by OMFS, dispo pending Ophtho eval. 21:00 Patient seen by Ophtho resident juan luis and senior. 21:00-22:00 9P First notified by RN Bjorn RUIZ patient's daughter threatening to leave with patient. I spoke with family member several times, appreciate that daughter is frustrated that the Ophthalmologist plan changes so frequently, where is the diamox, when are they going home. Explained to patient that the ophtho plan frequently evolves, as of now diamox cancelled, they will return. If patient leaves without reducing the pressure, she could permamently lose vision in her left eye. Daughter amenable to waiting. 22:10 Patient's daughter asking to leave with patient. Ophtho appeared to take repeat pressure, daughter went with consultant to patient's bedside. Following additional drops, ophtho got repeat IOP in 20s. Ophtho to write discharge meds in their consult. 22:30 Dc papers still pending ophtho consultant reccs. 22:40 Daughter approaching desk frequently while ED provider preparing the dc papers, upset that Ophtho said they can go, where are the papers. Explained to the daughter that I am preparing the papers but due to both consultations recommendations, 6-7 prescriptions, varying concentrations, and time needed to clarify which drops are both eyes and which are left eye only, dc papers are very detailed and are taking some time to prepare. Explained to patient's daughter the importance of clear instructions and return precautions. I reviewed the below dc instructions with the family and sent the medications to the preferred pharmacy.

## 2019-03-13 RX ORDER — PREDNISOLONE SODIUM PHOSPHATE 1 %
1 DROPS OPHTHALMIC (EYE)
Qty: 1 | Refills: 0 | OUTPATIENT
Start: 2019-03-13

## 2019-03-13 RX ORDER — ACETAZOLAMIDE 250 MG/1
1 TABLET ORAL
Qty: 28 | Refills: 0 | OUTPATIENT
Start: 2019-03-13 | End: 2019-03-19

## 2019-03-13 RX ORDER — DORZOLAMIDE HYDROCHLORIDE TIMOLOL MALEATE 20; 5 MG/ML; MG/ML
1 SOLUTION/ DROPS OPHTHALMIC
Qty: 1 | Refills: 0 | OUTPATIENT
Start: 2019-03-13

## 2019-03-13 RX ORDER — BRIMONIDINE TARTRATE 2 MG/MG
1 SOLUTION/ DROPS OPHTHALMIC
Qty: 1 | Refills: 0 | OUTPATIENT
Start: 2019-03-13

## 2019-03-13 RX ORDER — LATANOPROST 0.05 MG/ML
1 SOLUTION/ DROPS OPHTHALMIC; TOPICAL
Qty: 1 | Refills: 0 | OUTPATIENT
Start: 2019-03-13

## 2020-02-22 ENCOUNTER — EMERGENCY (EMERGENCY)
Facility: HOSPITAL | Age: 80
LOS: 1 days | Discharge: HOME | End: 2020-02-22
Attending: STUDENT IN AN ORGANIZED HEALTH CARE EDUCATION/TRAINING PROGRAM
Payer: MEDICARE

## 2020-02-22 VITALS
DIASTOLIC BLOOD PRESSURE: 55 MMHG | RESPIRATION RATE: 18 BRPM | TEMPERATURE: 98 F | OXYGEN SATURATION: 99 % | SYSTOLIC BLOOD PRESSURE: 146 MMHG | HEART RATE: 68 BPM

## 2020-02-22 DIAGNOSIS — Z90.49 ACQUIRED ABSENCE OF OTHER SPECIFIED PARTS OF DIGESTIVE TRACT: Chronic | ICD-10-CM

## 2020-02-22 DIAGNOSIS — Z98.890 OTHER SPECIFIED POSTPROCEDURAL STATES: Chronic | ICD-10-CM

## 2020-02-22 PROCEDURE — 99283 EMERGENCY DEPT VISIT LOW MDM: CPT

## 2020-02-22 RX ORDER — ACETAMINOPHEN 500 MG
975 TABLET ORAL ONCE
Refills: 0 | Status: COMPLETED | OUTPATIENT
Start: 2020-02-22 | End: 2020-02-22

## 2020-02-22 RX ORDER — LIDOCAINE 4 G/100G
1 CREAM TOPICAL
Qty: 7 | Refills: 0
Start: 2020-02-22 | End: 2020-02-28

## 2020-02-22 RX ORDER — LIDOCAINE 4 G/100G
1 CREAM TOPICAL ONCE
Refills: 0 | Status: COMPLETED | OUTPATIENT
Start: 2020-02-22 | End: 2020-02-22

## 2020-02-22 RX ORDER — CYCLOBENZAPRINE HYDROCHLORIDE 10 MG/1
1 TABLET, FILM COATED ORAL
Qty: 15 | Refills: 0
Start: 2020-02-22 | End: 2020-02-26

## 2020-02-22 RX ORDER — ACETAMINOPHEN 500 MG
2 TABLET ORAL
Qty: 112 | Refills: 0
Start: 2020-02-22 | End: 2020-03-06

## 2020-02-22 RX ORDER — DIAZEPAM 5 MG
5 TABLET ORAL ONCE
Refills: 0 | Status: DISCONTINUED | OUTPATIENT
Start: 2020-02-22 | End: 2020-02-22

## 2020-02-22 RX ORDER — IBUPROFEN 200 MG
600 TABLET ORAL ONCE
Refills: 0 | Status: COMPLETED | OUTPATIENT
Start: 2020-02-22 | End: 2020-02-22

## 2020-02-22 RX ADMIN — Medication 975 MILLIGRAM(S): at 08:40

## 2020-02-22 RX ADMIN — Medication 600 MILLIGRAM(S): at 08:40

## 2020-02-22 RX ADMIN — LIDOCAINE 1 PATCH: 4 CREAM TOPICAL at 08:40

## 2020-02-22 RX ADMIN — Medication 5 MILLIGRAM(S): at 09:05

## 2020-02-22 NOTE — ED PROVIDER NOTE - ATTENDING CONTRIBUTION TO CARE
I performed a history and physical exam of the patient and discussed their management with the PA/NP.  I reviewed the ACP's note and agree with the documented findings and plan of care except as noted below. My medical decision making and observations are as follows:    78 y/o F PMH arthritis, HLD, dementia c/o right lower back pain radiating down anterior rle x 2 weeks. Pt had bent over to pick something up when sxs began. Pt had MRI showing lumbar herniated disc and was prescribed PT. Pt has had 5 sessions of PT with temporary relief, but has not been able to sleep well the past 2 nights d/t pain.  denies numbness/weakness of legs, no bowel or bladder incontinence.  Pt in NAD, A&O x 3, heart rrr, lungs cta, abd soft ntnd, full ROM b/l lower extremities, no midline vertebral ttp, +straight leg raise on right, 5/5 strength b/l lower extremities.  Likely sciatica given location of pain and radiation down leg.  Will give muscle relaxant, pain control and reassess.  As patient is already able to ambulate, will likely be able to discharge home with medications.

## 2020-02-22 NOTE — ED PROVIDER NOTE - NSFOLLOWUPINSTRUCTIONS_ED_ALL_ED_FT
Follow with your PMD within 48-72 hours.  Rest, no heavy lifting.  Warm compresses to affected area. Recommend Ortho consult to discuss possible MRI vs Physical Therapy- referral list provided.  Light walking. Continue Naprosyn as prescribed (stop taking other anti-inflammatory medications including dicofenac and meloxicam while taking this medication). Take cyclobenzaprine 5mg every 8 hours as needed for muscle spasm- caution drowsiness/do not drive. Take tylenol extra strength (500mg) 1-2 tabs every 6 hours as needed for pain. Any worsening pain, weakness, numbness, bowel or urinary incontinence return to ER

## 2020-02-22 NOTE — ED PROVIDER NOTE - PATIENT PORTAL LINK FT
You can access the FollowMyHealth Patient Portal offered by [facility] by registering at the following website: [email or web address]. By joining [facility]’s FollowMyHealth portal, you will also be able to view your health information using other applications (apps) compatible with our system.

## 2020-02-22 NOTE — ED ADULT TRIAGE NOTE - CHIEF COMPLAINT QUOTE
right knee pain    c/o lower back and right knee pain approximately 2 weeks ago after rising from bending position... is in PT but pain worse... burning to back of knee.  intermittent mild numbness right leg (none at this time)... denies incontinence or saddle anesthesia.

## 2020-02-22 NOTE — ED ADULT NURSE NOTE - OBJECTIVE STATEMENT
Received patient to room 23 with family at bedside. Pt evaluated by MD. Pt c/o right lower back pain and knee pain. Pt given medication as ordered with some relief. Pt waiting for further evaluation and disposition.    MADY Urias

## 2020-02-22 NOTE — ED PROVIDER NOTE - CLINICAL SUMMARY MEDICAL DECISION MAKING FREE TEXT BOX
pt with known lumbar HNP with radicular pain down rle, no neuro deficits of concerning ssx of cauda equina. Pt on multiple nsaids - advised to limit to one nsaid (will continue naprosyn). Will add further medication regimen for pain including muscle relaxer, lidoderm, and tylenol and give spine referral

## 2020-02-22 NOTE — ED ADULT NURSE NOTE - NSIMPLEMENTINTERV_GEN_ALL_ED
Implemented All Universal Safety Interventions:  [place] to call system. Call bell, personal items and telephone within reach. Instruct patient to call for assistance. Room bathroom lighting operational. Non-slip footwear when patient is off stretcher. Physically safe environment: no spills, clutter or unnecessary equipment. Stretcher in lowest position, wheels locked, appropriate side rails in place.

## 2020-02-22 NOTE — ED PROVIDER NOTE - PHYSICAL EXAMINATION
spine - no midline ttp to c/t/l spine, + ttp over sciatic region of right gluteus with radiation of pain to anterior leg. FROM of hip/knee/ankle without reproduction of pain, no ttp to hip/femur/knee, + SLR of rle, no saddle anesthesia, sensation and strength intact distally

## 2020-02-22 NOTE — ED PROVIDER NOTE - OBJECTIVE STATEMENT
phone offered - pt prefers daughter to translate.   80 y/o F PMH arthritis, HLD, dementia c/o right lower back pain radiating down anterior rle x 2 weeks. Pt had bent over to pick something up when sxs began. Pt had MRI showing lumbar herniated disc and was prescribed PT. Pt has had 5 sessions of PT with temporary relief, but has not been able to sleep well the past 2 nights d/t pain. Pt taking naproxen for pain once-twice/day. Also prescribed meloxicam and diclofenac for arthritis which pt has also been taking. Intermittently pt notes pins and needles sensation that travels from anterior thigh to knee. Denies fever, chills, CP, SOB, abdominal pain, N/V, bladder/bowel dysfunction, weakness, new injury/trauma, no pain to back of knee despite triage note.

## 2020-02-25 ENCOUNTER — APPOINTMENT (OUTPATIENT)
Dept: ORTHOPEDIC SURGERY | Facility: CLINIC | Age: 80
End: 2020-02-25
Payer: MEDICARE

## 2020-02-25 DIAGNOSIS — Z86.39 PERSONAL HISTORY OF OTHER ENDOCRINE, NUTRITIONAL AND METABOLIC DISEASE: ICD-10-CM

## 2020-02-25 DIAGNOSIS — Z87.39 PERSONAL HISTORY OF OTHER DISEASES OF THE MUSCULOSKELETAL SYSTEM AND CONNECTIVE TISSUE: ICD-10-CM

## 2020-02-25 DIAGNOSIS — Z78.9 OTHER SPECIFIED HEALTH STATUS: ICD-10-CM

## 2020-02-25 DIAGNOSIS — Z56.0 UNEMPLOYMENT, UNSPECIFIED: ICD-10-CM

## 2020-02-25 DIAGNOSIS — M43.16 SPONDYLOLISTHESIS, LUMBAR REGION: ICD-10-CM

## 2020-02-25 PROCEDURE — 99204 OFFICE O/P NEW MOD 45 MIN: CPT

## 2020-02-25 PROCEDURE — 72100 X-RAY EXAM L-S SPINE 2/3 VWS: CPT

## 2020-02-25 SDOH — ECONOMIC STABILITY - INCOME SECURITY: UNEMPLOYMENT, UNSPECIFIED: Z56.0

## 2020-03-01 ENCOUNTER — OUTPATIENT (OUTPATIENT)
Dept: OUTPATIENT SERVICES | Facility: HOSPITAL | Age: 80
LOS: 1 days | End: 2020-03-01
Payer: MEDICARE

## 2020-03-01 DIAGNOSIS — Z90.49 ACQUIRED ABSENCE OF OTHER SPECIFIED PARTS OF DIGESTIVE TRACT: Chronic | ICD-10-CM

## 2020-03-01 DIAGNOSIS — Z98.890 OTHER SPECIFIED POSTPROCEDURAL STATES: Chronic | ICD-10-CM

## 2020-03-01 PROCEDURE — G9001: CPT

## 2020-04-08 DIAGNOSIS — Z71.89 OTHER SPECIFIED COUNSELING: ICD-10-CM

## 2020-05-20 ENCOUNTER — RX RENEWAL (OUTPATIENT)
Age: 80
End: 2020-05-20

## 2020-07-10 ENCOUNTER — APPOINTMENT (OUTPATIENT)
Dept: INTERNAL MEDICINE | Facility: CLINIC | Age: 80
End: 2020-07-10
Payer: MEDICARE

## 2020-07-10 ENCOUNTER — NON-APPOINTMENT (OUTPATIENT)
Age: 80
End: 2020-07-10

## 2020-07-10 VITALS
HEIGHT: 63 IN | SYSTOLIC BLOOD PRESSURE: 120 MMHG | WEIGHT: 204 LBS | DIASTOLIC BLOOD PRESSURE: 70 MMHG | BODY MASS INDEX: 36.14 KG/M2

## 2020-07-10 DIAGNOSIS — Z82.49 FAMILY HISTORY OF ISCHEMIC HEART DISEASE AND OTHER DISEASES OF THE CIRCULATORY SYSTEM: ICD-10-CM

## 2020-07-10 DIAGNOSIS — M48.07 SPINAL STENOSIS, LUMBOSACRAL REGION: ICD-10-CM

## 2020-07-10 DIAGNOSIS — Z80.3 FAMILY HISTORY OF MALIGNANT NEOPLASM OF BREAST: ICD-10-CM

## 2020-07-10 DIAGNOSIS — R53.83 OTHER FATIGUE: ICD-10-CM

## 2020-07-10 LAB — SAVE SPECIMEN: NORMAL

## 2020-07-10 PROCEDURE — 36415 COLL VENOUS BLD VENIPUNCTURE: CPT

## 2020-07-10 PROCEDURE — 93000 ELECTROCARDIOGRAM COMPLETE: CPT

## 2020-07-10 PROCEDURE — G0438: CPT

## 2020-07-10 PROCEDURE — 99203 OFFICE O/P NEW LOW 30 MIN: CPT | Mod: 25

## 2020-07-10 RX ORDER — TIMOLOL MALEATE 5 MG/ML
0.5 SOLUTION OPHTHALMIC
Refills: 0 | Status: ACTIVE | COMMUNITY

## 2020-07-10 NOTE — PHYSICAL EXAM
[No Acute Distress] : no acute distress [Well Developed] : well developed [Well-Appearing] : well-appearing [Well Nourished] : well nourished [PERRL] : pupils equal round and reactive to light [Normal Sclera/Conjunctiva] : normal sclera/conjunctiva [Normal TMs] : both tympanic membranes were normal [Normal Outer Ear/Nose] : the outer ears and nose were normal in appearance [Normal Oropharynx] : the oropharynx was normal [No Lymphadenopathy] : no lymphadenopathy [Supple] : supple [No Accessory Muscle Use] : no accessory muscle use [No Respiratory Distress] : no respiratory distress  [Thyroid Normal, No Nodules] : the thyroid was normal and there were no nodules present [Clear to Auscultation] : lungs were clear to auscultation bilaterally [Normal Rate] : normal rate  [Normal S1, S2] : normal S1 and S2 [Regular Rhythm] : with a regular rhythm [No Murmur] : no murmur heard [No Carotid Bruits] : no carotid bruits [No Edema] : there was no peripheral edema [Pedal Pulses Present] : the pedal pulses are present [Soft] : abdomen soft [Non Tender] : non-tender [Non-distended] : non-distended [Normal Bowel Sounds] : normal bowel sounds [Normal Axillary Nodes] : no axillary lymphadenopathy [Normal Supraclavicular Nodes] : no supraclavicular lymphadenopathy [Normal Posterior Cervical Nodes] : no posterior cervical lymphadenopathy [Normal Inguinal Nodes] : no inguinal lymphadenopathy [Normal Anterior Cervical Nodes] : no anterior cervical lymphadenopathy [Grossly Normal Strength/Tone] : grossly normal strength/tone [No Focal Deficits] : no focal deficits [Normal Gait] : normal gait [Normal Insight/Judgement] : insight and judgment were intact [Normal Affect] : the affect was normal [No Axillary Lymphadenopathy] : no axillary lymphadenopathy [Normal Appearance] : normal in appearance [No Masses] : no palpable masses

## 2020-07-10 NOTE — PLAN
[FreeTextEntry1] : EKG- NSR 65- non spec T wave abn\par rec stopping ASA- pt has no hx of CAD, CVA\par pt to release medical records.

## 2020-07-10 NOTE — HISTORY OF PRESENT ILLNESS
[FreeTextEntry1] : CPE [de-identified] : vaccine- pt to release rec\par diet- has prob w portion size\par exercise-no\par fatigue-worse since being a \par hearing loss , tinnitis.-chronic has hearing aid\par easy bruising- no bleeding prob during surg.  no gum bleeding\par freq urination- at night.  - started 4 yr ago- wakes up 3-4 times at night\par no warts- has mole\par back pan- hx of lumbar stenosis\par constip/ thin stools- her whole life.  hx of diverticuli, polyp.  pt is due for repeat colonoscopy\par gastritis- on PPI.  had EGD

## 2020-07-10 NOTE — HEALTH RISK ASSESSMENT
[0] : 1) Little interest or pleasure doing things: Not at all (0) [Good] : ~his/her~  mood as  good [Never (0 pts)] : Never (0 points) [One fall no injury in past year] : Patient reported one fall in the past year without injury [No] : In the past 12 months have you used drugs other than those required for medical reasons? No [1] : 2) Feeling down, depressed, or hopeless for several days (1) [HIV test declined] : HIV test declined [Change in mental status noted] : Change in mental status noted [With Family] : lives with family [High School] : high school [Retired] : retired [] :  [Fully functional (bathing, dressing, toileting, transferring, walking, feeding)] : Fully functional (bathing, dressing, toileting, transferring, walking, feeding) [Feels Safe at Home] : Feels safe at home [Fully functional (using the telephone, shopping, preparing meals, housekeeping, doing laundry, using] : Fully functional and needs no help or supervision to perform IADLs (using the telephone, shopping, preparing meals, housekeeping, doing laundry, using transportation, managing medications and managing finances) [Patient reported bone density results were normal] : Patient reported bone density results were normal [Patient reported mammogram was normal] : Patient reported mammogram was normal [Patient reported PAP Smear was normal] : Patient reported PAP Smear was normal [Patient reported colonoscopy was abnormal] : Patient reported colonoscopy was abnormal [With Patient/Caregiver] : With Patient/Caregiver [Designated Healthcare Proxy] : Designated healthcare proxy [Relationship: ___] : Relationship: [unfilled] [] : No [Audit-CScore] : 0 [MammogramDate] : 01/18 [PapSmearDate] : 01/18 [BoneDensityDate] : 01/18 [ColonoscopyDate] : 01/19 [ColonoscopyComments] : incomplete- diverticuli, polyp [de-identified] : daughter [de-identified] : uses hearing aid [de-identified] : see ophth 06/20 [AdvancecareDate] : 07/20

## 2020-07-10 NOTE — COUNSELING
[Potential consequences of obesity discussed] : Potential consequences of obesity discussed [Structured Weight Management Program suggested:] : Structured weight management program suggested [Good understanding] : Patient has a good understanding of disease, goals and obesity follow-up plan [Benefits of weight loss discussed] : Benefits of weight loss discussed

## 2020-07-14 LAB
25(OH)D3 SERPL-MCNC: 26.3 NG/ML
ALBUMIN SERPL ELPH-MCNC: 4.4 G/DL
ALP BLD-CCNC: 78 U/L
ALT SERPL-CCNC: 14 U/L
ANION GAP SERPL CALC-SCNC: 15 MMOL/L
AST SERPL-CCNC: 24 U/L
BASOPHILS # BLD AUTO: 0.06 K/UL
BASOPHILS NFR BLD AUTO: 0.8 %
BILIRUB SERPL-MCNC: 0.3 MG/DL
BUN SERPL-MCNC: 21 MG/DL
CALCIUM SERPL-MCNC: 9.9 MG/DL
CHLORIDE SERPL-SCNC: 103 MMOL/L
CHOLEST SERPL-MCNC: 176 MG/DL
CHOLEST/HDLC SERPL: 3.4 RATIO
CO2 SERPL-SCNC: 25 MMOL/L
CREAT SERPL-MCNC: 0.91 MG/DL
EOSINOPHIL # BLD AUTO: 0.32 K/UL
EOSINOPHIL NFR BLD AUTO: 4.1 %
ESTIMATED AVERAGE GLUCOSE: 120 MG/DL
GLUCOSE SERPL-MCNC: 100 MG/DL
HBA1C MFR BLD HPLC: 5.8 %
HCT VFR BLD CALC: 44 %
HDLC SERPL-MCNC: 51 MG/DL
HGB BLD-MCNC: 13.8 G/DL
IMM GRANULOCYTES NFR BLD AUTO: 0.4 %
LDLC SERPL CALC-MCNC: 74 MG/DL
LYMPHOCYTES # BLD AUTO: 1.85 K/UL
LYMPHOCYTES NFR BLD AUTO: 23.5 %
MAN DIFF?: NORMAL
MCHC RBC-ENTMCNC: 30 PG
MCHC RBC-ENTMCNC: 31.4 GM/DL
MCV RBC AUTO: 95.7 FL
MONOCYTES # BLD AUTO: 0.71 K/UL
MONOCYTES NFR BLD AUTO: 9 %
NEUTROPHILS # BLD AUTO: 4.9 K/UL
NEUTROPHILS NFR BLD AUTO: 62.2 %
PLATELET # BLD AUTO: 185 K/UL
POTASSIUM SERPL-SCNC: 4.2 MMOL/L
PROT SERPL-MCNC: 7 G/DL
RBC # BLD: 4.6 M/UL
RBC # FLD: 12.2 %
SODIUM SERPL-SCNC: 143 MMOL/L
T3 SERPL-MCNC: 122 NG/DL
T4 FREE SERPL-MCNC: 0.8 NG/DL
TRIGL SERPL-MCNC: 250 MG/DL
TSH SERPL-ACNC: 5.1 UIU/ML
WBC # FLD AUTO: 7.87 K/UL

## 2020-07-23 ENCOUNTER — RX RENEWAL (OUTPATIENT)
Age: 80
End: 2020-07-23

## 2020-07-24 ENCOUNTER — TRANSCRIPTION ENCOUNTER (OUTPATIENT)
Age: 80
End: 2020-07-24

## 2020-08-18 ENCOUNTER — APPOINTMENT (OUTPATIENT)
Dept: INTERNAL MEDICINE | Facility: CLINIC | Age: 80
End: 2020-08-18

## 2020-08-25 ENCOUNTER — APPOINTMENT (OUTPATIENT)
Dept: INTERNAL MEDICINE | Facility: CLINIC | Age: 80
End: 2020-08-25
Payer: MEDICARE

## 2020-08-25 VITALS — DIASTOLIC BLOOD PRESSURE: 70 MMHG | SYSTOLIC BLOOD PRESSURE: 130 MMHG

## 2020-08-25 VITALS
OXYGEN SATURATION: 97 % | TEMPERATURE: 98.5 F | DIASTOLIC BLOOD PRESSURE: 60 MMHG | HEART RATE: 79 BPM | WEIGHT: 204 LBS | HEIGHT: 63 IN | SYSTOLIC BLOOD PRESSURE: 134 MMHG | BODY MASS INDEX: 36.14 KG/M2

## 2020-08-25 DIAGNOSIS — E78.1 PURE HYPERGLYCERIDEMIA: ICD-10-CM

## 2020-08-25 DIAGNOSIS — Z87.19 PERSONAL HISTORY OF OTHER DISEASES OF THE DIGESTIVE SYSTEM: ICD-10-CM

## 2020-08-25 DIAGNOSIS — E78.5 HYPERLIPIDEMIA, UNSPECIFIED: ICD-10-CM

## 2020-08-25 PROCEDURE — 99214 OFFICE O/P EST MOD 30 MIN: CPT

## 2020-08-25 RX ORDER — LEVOTHYROXINE SODIUM 0.05 MG/1
50 TABLET ORAL DAILY
Qty: 1 | Refills: 0 | Status: DISCONTINUED | COMMUNITY
Start: 2020-07-14 | End: 2020-08-25

## 2020-08-25 RX ORDER — MEMANTINE HYDROCHLORIDE 5 MG/1
5 TABLET, FILM COATED ORAL
Refills: 0 | Status: DISCONTINUED | COMMUNITY
End: 2020-08-25

## 2020-08-25 RX ORDER — ELASTIC BANDAGE 1"X2.2YD
315-6.25 BANDAGE TOPICAL
Qty: 60 | Refills: 3 | Status: ACTIVE | COMMUNITY
Start: 2020-07-14 | End: 1900-01-01

## 2020-08-25 NOTE — REVIEW OF SYSTEMS
[Shortness Of Breath] : no shortness of breath [Chest Pain] : no chest pain [FreeTextEntry1] : see hpi

## 2020-08-25 NOTE — COUNSELING
[Potential consequences of obesity discussed] : Potential consequences of obesity discussed [Benefits of weight loss discussed] : Benefits of weight loss discussed [Encouraged to use exercise tracking device] : Encouraged to use exercise tracking device [Good understanding] : Patient has a good understanding of disease, goals and obesity follow-up plan

## 2020-08-25 NOTE — HISTORY OF PRESENT ILLNESS
[de-identified] : pt accompanied by her daughter., Jennifer\par hypothyroid- started levothyroid last visit.  pt stopped thyroid meds 1 wk b/c it made her dizzy. no longer dizzy since stopping meds.  did labs for thyroid last wk\par gastritis- no symptoms but had stomach upset after eating something yes\par colon ca screen- pt was seen by GI last yr but imcomplete colonoscopy due to inability move scope further.  pt had CT  but there was obstruction and then saw CT surgery.   pt\par IGT- 5.8- diet, exercise discussed\par obesity- see abofe\par ?dementia- has f/u w Neuro.  pt stopped memantine.  pt knows names of people, directions, cooks, puzzles.\par no CP or SOB\par colon polyp- f/u w GI\par freq urination-now not an issue [FreeTextEntry1] : hypothyroid

## 2020-08-25 NOTE — PLAN
[FreeTextEntry1] : gastritis- resolved \par colon ca screen- rec f/u w GI\par memory- prob age related.  not a major issue.  no need to see Neuro\par IGT- diet, exercise\par thyroid-  . [facility] thyroid was nl- on meds.  rpt thyroid test off meds in 6 wk

## 2020-11-06 ENCOUNTER — APPOINTMENT (OUTPATIENT)
Dept: INTERNAL MEDICINE | Facility: CLINIC | Age: 80
End: 2020-11-06
Payer: MEDICARE

## 2020-11-06 VITALS
HEIGHT: 63 IN | OXYGEN SATURATION: 98 % | WEIGHT: 200 LBS | DIASTOLIC BLOOD PRESSURE: 80 MMHG | BODY MASS INDEX: 35.44 KG/M2 | SYSTOLIC BLOOD PRESSURE: 140 MMHG | TEMPERATURE: 97.5 F | HEART RATE: 73 BPM

## 2020-11-06 DIAGNOSIS — E03.9 HYPOTHYROIDISM, UNSPECIFIED: ICD-10-CM

## 2020-11-06 LAB
SAVE SPECIMEN: NORMAL
T4 FREE SERPL-MCNC: 0.9 NG/DL
TSH SERPL-ACNC: 5.78 UIU/ML

## 2020-11-06 PROCEDURE — 36415 COLL VENOUS BLD VENIPUNCTURE: CPT

## 2020-11-06 PROCEDURE — 99214 OFFICE O/P EST MOD 30 MIN: CPT | Mod: 25

## 2020-11-06 NOTE — HISTORY OF PRESENT ILLNESS
[FreeTextEntry1] : hypothyrioid [de-identified] : pt accompanied by daughter, Jennifer\par hypothyroid- pt off the thyroid meds\par  IGT- pt cut down on calories.  walking daily.  no CP or SOB, palpitation\par colon polyp- f/u w GI.  stomach upset last visit.  pt is to f/u every 6 mo and no further colonoscopy- as per daughter\par obesity- pt trying to lose wgt\par newly dx w skin ca- had excision and freezing-lesion was superficial\par pt got flu and pneum 23 vac 9/20

## 2020-11-06 NOTE — REVIEW OF SYSTEMS
[Chest Pain] : no chest pain [Palpitations] : no palpitations [Shortness Of Breath] : no shortness of breath [FreeTextEntry1] : as per hpi

## 2020-12-29 ENCOUNTER — RX RENEWAL (OUTPATIENT)
Age: 80
End: 2020-12-29

## 2021-01-15 NOTE — ASU PREOP CHECKLIST - ALLERGIES REVIEWED
Alix called in stating pt was admitted to [facility].     Alix (daughter) stated [facility] is requesting H&P info.     Alix did not have the fax number.     Any questions please call pt's  at [phone] or daughter on [phone].    Daughter is requesting this to be done today. Pt is being admitted on Saturday.  
INFORMATION SENT   
done

## 2021-02-25 ENCOUNTER — APPOINTMENT (OUTPATIENT)
Dept: INTERNAL MEDICINE | Facility: CLINIC | Age: 81
End: 2021-02-25
Payer: MEDICARE

## 2021-02-25 VITALS
HEART RATE: 75 BPM | TEMPERATURE: 97.9 F | BODY MASS INDEX: 35.43 KG/M2 | DIASTOLIC BLOOD PRESSURE: 78 MMHG | WEIGHT: 200 LBS | OXYGEN SATURATION: 96 % | SYSTOLIC BLOOD PRESSURE: 143 MMHG

## 2021-02-25 VITALS — SYSTOLIC BLOOD PRESSURE: 150 MMHG | DIASTOLIC BLOOD PRESSURE: 80 MMHG

## 2021-02-25 PROCEDURE — 99214 OFFICE O/P EST MOD 30 MIN: CPT

## 2021-02-25 NOTE — HISTORY OF PRESENT ILLNESS
[FreeTextEntry1] : IGT [de-identified] : pt accompanied by her daughter, Wilder- who also served as \par hyperlipid-  Patient is compliant with diet and medications.  She  has no abdominal pain,  and myalgia\par Patient exercises:\par \par IGT- pt using a cane.  reviewed diet \par obesity- discussed diet w pt\par subclinical hypothyroid\par pt daughter served as \par CT 7/20/20- CT sevee diveriticuli, L kidney stone, cyst.  mild bibasilar pulm fibrosis

## 2021-03-16 ENCOUNTER — RX RENEWAL (OUTPATIENT)
Age: 81
End: 2021-03-16

## 2021-04-03 ENCOUNTER — APPOINTMENT (OUTPATIENT)
Dept: RADIOLOGY | Facility: CLINIC | Age: 81
End: 2021-04-03

## 2021-04-03 ENCOUNTER — APPOINTMENT (OUTPATIENT)
Dept: CT IMAGING | Facility: CLINIC | Age: 81
End: 2021-04-03

## 2021-04-07 ENCOUNTER — TRANSCRIPTION ENCOUNTER (OUTPATIENT)
Age: 81
End: 2021-04-07

## 2021-04-07 ENCOUNTER — APPOINTMENT (OUTPATIENT)
Dept: INTERNAL MEDICINE | Facility: CLINIC | Age: 81
End: 2021-04-07

## 2021-04-09 ENCOUNTER — TRANSCRIPTION ENCOUNTER (OUTPATIENT)
Age: 81
End: 2021-04-09

## 2021-04-13 ENCOUNTER — TRANSCRIPTION ENCOUNTER (OUTPATIENT)
Age: 81
End: 2021-04-13

## 2021-04-13 ENCOUNTER — APPOINTMENT (OUTPATIENT)
Dept: DISASTER EMERGENCY | Facility: CLINIC | Age: 81
End: 2021-04-13

## 2021-04-13 ENCOUNTER — NON-APPOINTMENT (OUTPATIENT)
Age: 81
End: 2021-04-13

## 2021-04-14 ENCOUNTER — APPOINTMENT (OUTPATIENT)
Dept: INTERNAL MEDICINE | Facility: CLINIC | Age: 81
End: 2021-04-14
Payer: MEDICARE

## 2021-04-14 VITALS
BODY MASS INDEX: 35.79 KG/M2 | TEMPERATURE: 97.2 F | HEART RATE: 70 BPM | DIASTOLIC BLOOD PRESSURE: 70 MMHG | SYSTOLIC BLOOD PRESSURE: 142 MMHG | HEIGHT: 63 IN | WEIGHT: 202 LBS | OXYGEN SATURATION: 98 %

## 2021-04-14 VITALS — DIASTOLIC BLOOD PRESSURE: 80 MMHG | SYSTOLIC BLOOD PRESSURE: 160 MMHG

## 2021-04-14 LAB — SARS-COV-2 N GENE NPH QL NAA+PROBE: NOT DETECTED

## 2021-04-14 PROCEDURE — 36415 COLL VENOUS BLD VENIPUNCTURE: CPT

## 2021-04-14 PROCEDURE — 99214 OFFICE O/P EST MOD 30 MIN: CPT | Mod: 25

## 2021-04-14 NOTE — HISTORY OF PRESENT ILLNESS
[FreeTextEntry1] : IGT [de-identified] : lipid- Patient is compliant with diet and medications.  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 Tim- and was told to f/u in 6 mo\par subclinical hypothyroid- labs today.  last labs were 11/6/20\par home /70's\par DEXA note from 4/13/21\par

## 2021-04-15 ENCOUNTER — TRANSCRIPTION ENCOUNTER (OUTPATIENT)
Age: 81
End: 2021-04-15

## 2021-04-15 LAB
ESTIMATED AVERAGE GLUCOSE: 120 MG/DL
HBA1C MFR BLD HPLC: 5.8 %
T4 FREE SERPL-MCNC: 0.8 NG/DL
TSH SERPL-ACNC: 3.83 UIU/ML

## 2021-04-16 ENCOUNTER — APPOINTMENT (OUTPATIENT)
Dept: PULMONOLOGY | Facility: CLINIC | Age: 81
End: 2021-04-16
Payer: MEDICARE

## 2021-04-16 VITALS
BODY MASS INDEX: 36.07 KG/M2 | WEIGHT: 196 LBS | DIASTOLIC BLOOD PRESSURE: 87 MMHG | OXYGEN SATURATION: 93 % | HEIGHT: 62 IN | HEART RATE: 70 BPM | SYSTOLIC BLOOD PRESSURE: 148 MMHG | TEMPERATURE: 97.4 F

## 2021-04-16 PROCEDURE — 99204 OFFICE O/P NEW MOD 45 MIN: CPT | Mod: 25

## 2021-04-16 PROCEDURE — 94010 BREATHING CAPACITY TEST: CPT

## 2021-04-16 PROCEDURE — 94726 PLETHYSMOGRAPHY LUNG VOLUMES: CPT

## 2021-04-16 PROCEDURE — 94729 DIFFUSING CAPACITY: CPT

## 2021-04-16 PROCEDURE — ZZZZZ: CPT

## 2021-04-16 NOTE — HISTORY OF PRESENT ILLNESS
[TextBox_4] : Patient is a lifelong nonsmoker, with a one year hx of 'clearing of throat'. Denies dyspnea. Denies hx of collagen vascular disease. No birds in the house. No hx of collagen vascular disease. Lifelong nonsmoker.

## 2021-04-16 NOTE — ASSESSMENT
[FreeTextEntry1] : 1, Pulmonary fibrosis: Abnormalities are subtle on CT scan and PFTs normal. Would prefer to watch and wait at this point rather than offer anti fibrotic therapy. Repeat PFTs in 6 months, repeat CT in one year.  Baseline bloodwork.  Trial of flonase for possible post nasal drip

## 2021-04-27 ENCOUNTER — APPOINTMENT (OUTPATIENT)
Dept: ULTRASOUND IMAGING | Facility: IMAGING CENTER | Age: 81
End: 2021-04-27

## 2021-05-07 ENCOUNTER — APPOINTMENT (OUTPATIENT)
Dept: INTERNAL MEDICINE | Facility: CLINIC | Age: 81
End: 2021-05-07

## 2021-09-17 ENCOUNTER — TRANSCRIPTION ENCOUNTER (OUTPATIENT)
Age: 81
End: 2021-09-17

## 2021-09-17 ENCOUNTER — RX RENEWAL (OUTPATIENT)
Age: 81
End: 2021-09-17

## 2021-09-24 ENCOUNTER — APPOINTMENT (OUTPATIENT)
Dept: INTERNAL MEDICINE | Facility: CLINIC | Age: 81
End: 2021-09-24
Payer: MEDICARE

## 2021-09-24 ENCOUNTER — NON-APPOINTMENT (OUTPATIENT)
Age: 81
End: 2021-09-24

## 2021-09-24 VITALS
WEIGHT: 200 LBS | OXYGEN SATURATION: 97 % | BODY MASS INDEX: 36.8 KG/M2 | HEIGHT: 62 IN | RESPIRATION RATE: 17 BRPM | HEART RATE: 66 BPM | DIASTOLIC BLOOD PRESSURE: 70 MMHG | SYSTOLIC BLOOD PRESSURE: 126 MMHG

## 2021-09-24 DIAGNOSIS — Z01.818 ENCOUNTER FOR OTHER PREPROCEDURAL EXAMINATION: ICD-10-CM

## 2021-09-24 DIAGNOSIS — K63.5 POLYP OF COLON: ICD-10-CM

## 2021-09-24 DIAGNOSIS — K76.0 FATTY (CHANGE OF) LIVER, NOT ELSEWHERE CLASSIFIED: ICD-10-CM

## 2021-09-24 DIAGNOSIS — R79.89 OTHER SPECIFIED ABNORMAL FINDINGS OF BLOOD CHEMISTRY: ICD-10-CM

## 2021-09-24 DIAGNOSIS — R41.3 OTHER AMNESIA: ICD-10-CM

## 2021-09-24 PROCEDURE — G0008: CPT

## 2021-09-24 PROCEDURE — 93000 ELECTROCARDIOGRAM COMPLETE: CPT

## 2021-09-24 PROCEDURE — G0439: CPT

## 2021-09-24 PROCEDURE — 99214 OFFICE O/P EST MOD 30 MIN: CPT | Mod: 25

## 2021-09-24 PROCEDURE — 90662 IIV NO PRSV INCREASED AG IM: CPT

## 2021-09-24 PROCEDURE — 36415 COLL VENOUS BLD VENIPUNCTURE: CPT

## 2021-09-24 RX ORDER — LATANOPROST/PF 0.005 %
0.01 DROPS OPHTHALMIC (EYE)
Qty: 2 | Refills: 0 | Status: ACTIVE | COMMUNITY
Start: 2021-04-28

## 2021-09-24 RX ORDER — BRIMONIDINE TARTRATE 1.5 MG/ML
0.15 SOLUTION/ DROPS OPHTHALMIC
Qty: 5 | Refills: 0 | Status: ACTIVE | COMMUNITY
Start: 2021-06-29

## 2021-09-24 RX ORDER — CALCIUM CITRATE/VITAMIN D3 315MG-6.25
TABLET ORAL
Refills: 0 | Status: DISCONTINUED | COMMUNITY
End: 2021-09-24

## 2021-09-24 RX ORDER — FLUTICASONE PROPIONATE 50 UG/1
50 SPRAY, METERED NASAL DAILY
Qty: 1 | Refills: 11 | Status: ACTIVE | COMMUNITY
Start: 2021-04-16 | End: 1900-01-01

## 2021-09-24 NOTE — PHYSICAL EXAM
[No Acute Distress] : no acute distress [Well Nourished] : well nourished [Well Developed] : well developed [Well-Appearing] : well-appearing [Normal Sclera/Conjunctiva] : normal sclera/conjunctiva [PERRL] : pupils equal round and reactive to light [Normal Outer Ear/Nose] : the outer ears and nose were normal in appearance [Normal Oropharynx] : the oropharynx was normal [Normal TMs] : both tympanic membranes were normal [No Lymphadenopathy] : no lymphadenopathy [Supple] : supple [No Respiratory Distress] : no respiratory distress  [No Accessory Muscle Use] : no accessory muscle use [Normal Rate] : normal rate  [Clear to Auscultation] : lungs were clear to auscultation bilaterally [Regular Rhythm] : with a regular rhythm [Normal S1, S2] : normal S1 and S2 [No Murmur] : no murmur heard [No Carotid Bruits] : no carotid bruits [Pedal Pulses Present] : the pedal pulses are present [No Edema] : there was no peripheral edema [Soft] : abdomen soft [Non Tender] : non-tender [Non-distended] : non-distended [No Masses] : no abdominal mass palpated [Normal Bowel Sounds] : normal bowel sounds [Normal Supraclavicular Nodes] : no supraclavicular lymphadenopathy [Normal Axillary Nodes] : no axillary lymphadenopathy [Normal Posterior Cervical Nodes] : no posterior cervical lymphadenopathy [Normal Anterior Cervical Nodes] : no anterior cervical lymphadenopathy [Normal Inguinal Nodes] : no inguinal lymphadenopathy [Grossly Normal Strength/Tone] : grossly normal strength/tone [No Focal Deficits] : no focal deficits [Normal Gait] : normal gait [Normal Affect] : the affect was normal [Normal Insight/Judgement] : insight and judgment were intact [Normal Appearance] : normal in appearance [No Axillary Lymphadenopathy] : no axillary lymphadenopathy [de-identified] : thyroid enlarged

## 2021-09-24 NOTE — PLAN
[FreeTextEntry1] : hi dose flu vac given\par leg cramps-hold statin for 2wk and see if the cramps are better- pt's daughter to call me in 2wk\par EKG- [id number]\par

## 2021-09-24 NOTE — HEALTH RISK ASSESSMENT
[Good] : ~his/her~  mood as  good [Never (0 pts)] : Never (0 points) [No] : In the past 12 months have you used drugs other than those required for medical reasons? No [Patient reported mammogram was normal] : Patient reported mammogram was normal [Patient reported PAP Smear was normal] : Patient reported PAP Smear was normal [Patient reported bone density results were normal] : Patient reported bone density results were normal [Patient reported colonoscopy was abnormal] : Patient reported colonoscopy was abnormal [HIV test declined] : HIV test declined [With Family] : lives with family [Retired] : retired [High School] : high school [] :  [Feels Safe at Home] : Feels safe at home [Fully functional (bathing, dressing, toileting, transferring, walking, feeding)] : Fully functional (bathing, dressing, toileting, transferring, walking, feeding) [Fully functional (using the telephone, shopping, preparing meals, housekeeping, doing laundry, using] : Fully functional and needs no help or supervision to perform IADLs (using the telephone, shopping, preparing meals, housekeeping, doing laundry, using transportation, managing medications and managing finances) [No falls in past year] : Patient reported no falls in the past year [1] : 1) Little interest or pleasure doing things for several days (1) [0] : 2) Feeling down, depressed, or hopeless: Not at all (0) [Smoke Detector] : smoke detector [Carbon Monoxide Detector] : carbon monoxide detector [Seat Belt] :  uses seat belt [Sunscreen] : uses sunscreen [With Patient/Caregiver] : , with patient/caregiver [Designated Healthcare Proxy] : Designated healthcare proxy [Relationship: ___] : Relationship: [unfilled] [] : No [Audit-CScore] : 0 [OOM9Jsptr] : 1 [Change in mental status noted] : No change in mental status noted [High Risk Behavior] : no high risk behavior [Travel to Developing Areas] : does not  travel to developing areas [MammogramDate] : 01/18 [MammogramComments] : pt doesn't want any further mammo [PapSmearDate] : 01/18 [BoneDensityDate] : 04/21 [ColonoscopyDate] : 01/19 [ColonoscopyComments] : incomplete- diverticuli, polyp [de-identified] : daughter [de-identified] : uses hearing aid [de-identified] : see ophth 4 mo ago [de-identified] : seen dentist 2020 [AdvancecareDate] : 09/21

## 2021-09-24 NOTE — HISTORY OF PRESENT ILLNESS
[FreeTextEntry1] : CPE [de-identified] : lipid- Patient is compliant with diet and medications.  She  has no abdominal pain,  and myalgia\par Patient exercises: no\par IGT- diet reviewed w pt\par obesity- using cane so limited ambulation\par basilar pulm fibrosis- reviewed CT chest 4/6/21- pulm scarring, mild bronchietasis, thyroid nodule, hepatitic steatosis -  reviewed Pulm note 4/16/21- rec rpt PFT 6 mo, rpt CT 1 yr\par leg cramps- - 2 /mo\par thyroid nodule- rec US\par thickening sigmoid- f/u w GI- Dr. Tim- summer 2021- has f/u 11/21-\par GERD- was tx with PPI\par subclinical hypothyroid- labs today.  last labs were 11/6/20\par thyroid nodule - reviewed 4/19/21 US- had FNA by Endo- benign as per daughter. f/u w endo- every 6 mo\par home BP under 130/70's\par skin CA - last wk seen Derm\par back pain- daughter states she is nerve pinching- had radiation R leg- on gabapentin- given by Orho

## 2021-09-27 ENCOUNTER — MED ADMIN CHARGE (OUTPATIENT)
Age: 81
End: 2021-09-27

## 2021-09-27 LAB
25(OH)D3 SERPL-MCNC: 54.4 NG/ML
ALBUMIN SERPL ELPH-MCNC: 4.5 G/DL
ALP BLD-CCNC: 77 U/L
ALT SERPL-CCNC: 12 U/L
ANION GAP SERPL CALC-SCNC: 11 MMOL/L
AST SERPL-CCNC: 24 U/L
BASOPHILS # BLD AUTO: 0.05 K/UL
BASOPHILS NFR BLD AUTO: 0.7 %
BILIRUB SERPL-MCNC: 0.8 MG/DL
BUN SERPL-MCNC: 19 MG/DL
CALCIUM SERPL-MCNC: 9.9 MG/DL
CHLORIDE SERPL-SCNC: 102 MMOL/L
CHOLEST SERPL-MCNC: 196 MG/DL
CO2 SERPL-SCNC: 26 MMOL/L
CREAT SERPL-MCNC: 1.04 MG/DL
EOSINOPHIL # BLD AUTO: 0.19 K/UL
EOSINOPHIL NFR BLD AUTO: 2.7 %
ESTIMATED AVERAGE GLUCOSE: 126 MG/DL
GLUCOSE SERPL-MCNC: 88 MG/DL
HBA1C MFR BLD HPLC: 6 %
HCT VFR BLD CALC: 43.2 %
HDLC SERPL-MCNC: 51 MG/DL
HGB BLD-MCNC: 14.1 G/DL
IMM GRANULOCYTES NFR BLD AUTO: 0.3 %
LDLC SERPL CALC-MCNC: 101 MG/DL
LDLC SERPL DIRECT ASSAY-MCNC: 112 MG/DL
LYMPHOCYTES # BLD AUTO: 1.87 K/UL
LYMPHOCYTES NFR BLD AUTO: 26.7 %
MAN DIFF?: NORMAL
MCHC RBC-ENTMCNC: 30.1 PG
MCHC RBC-ENTMCNC: 32.6 GM/DL
MCV RBC AUTO: 92.3 FL
MONOCYTES # BLD AUTO: 0.61 K/UL
MONOCYTES NFR BLD AUTO: 8.7 %
NEUTROPHILS # BLD AUTO: 4.26 K/UL
NEUTROPHILS NFR BLD AUTO: 60.9 %
NONHDLC SERPL-MCNC: 145 MG/DL
PLATELET # BLD AUTO: 204 K/UL
POTASSIUM SERPL-SCNC: 4.5 MMOL/L
PROT SERPL-MCNC: 7.4 G/DL
RBC # BLD: 4.68 M/UL
RBC # FLD: 12.6 %
SODIUM SERPL-SCNC: 139 MMOL/L
T4 FREE SERPL-MCNC: 0.9 NG/DL
TRIGL SERPL-MCNC: 220 MG/DL
TSH SERPL-ACNC: 4.28 UIU/ML
WBC # FLD AUTO: 7 K/UL

## 2021-10-11 ENCOUNTER — TRANSCRIPTION ENCOUNTER (OUTPATIENT)
Age: 81
End: 2021-10-11

## 2021-10-11 ENCOUNTER — APPOINTMENT (OUTPATIENT)
Dept: DISASTER EMERGENCY | Facility: CLINIC | Age: 81
End: 2021-10-11

## 2021-10-28 ENCOUNTER — APPOINTMENT (OUTPATIENT)
Dept: DISASTER EMERGENCY | Facility: CLINIC | Age: 81
End: 2021-10-28

## 2021-10-29 LAB — SARS-COV-2 N GENE NPH QL NAA+PROBE: NOT DETECTED

## 2021-11-01 ENCOUNTER — APPOINTMENT (OUTPATIENT)
Dept: PULMONOLOGY | Facility: CLINIC | Age: 81
End: 2021-11-01
Payer: MEDICARE

## 2021-11-01 VITALS
BODY MASS INDEX: 37.57 KG/M2 | DIASTOLIC BLOOD PRESSURE: 78 MMHG | TEMPERATURE: 95.9 F | SYSTOLIC BLOOD PRESSURE: 133 MMHG | WEIGHT: 199 LBS | HEIGHT: 61 IN | OXYGEN SATURATION: 95 % | HEART RATE: 72 BPM

## 2021-11-01 PROCEDURE — ZZZZZ: CPT

## 2021-11-01 PROCEDURE — 94726 PLETHYSMOGRAPHY LUNG VOLUMES: CPT

## 2021-11-01 PROCEDURE — 94010 BREATHING CAPACITY TEST: CPT

## 2021-11-01 PROCEDURE — 94729 DIFFUSING CAPACITY: CPT

## 2021-11-01 PROCEDURE — 99214 OFFICE O/P EST MOD 30 MIN: CPT | Mod: 25

## 2021-11-01 NOTE — ASSESSMENT
[FreeTextEntry1] : PFTs unchanged/ normal.(adjusting for technique of diffusing capacity)  Remains asymptomatic. Repeat CT chest in 6 months. \par \par Monique GRAY NP, am scribing for and in the presence of Dr. Charly Hackett, the following sections HISTORY OF PRESENT ILLNESS, PAST MEDICAL/FAMILY/SOCIAL HISTORY; REVIEW OF SYSTEMS; VITAL SIGNS; PHYSICAL EXAM; DISPOSITION.\par \par

## 2021-11-01 NOTE — PHYSICAL EXAM
[No Acute Distress] : no acute distress [Normal Rate/Rhythm] : normal rate/rhythm [Normal S1, S2] : normal s1, s2 [No Resp Distress] : no resp distress [Rales] : rales [Normal Gait] : normal gait [No Edema] : no edema [Oriented x3] : oriented x3

## 2021-11-22 ENCOUNTER — RX RENEWAL (OUTPATIENT)
Age: 81
End: 2021-11-22

## 2022-01-11 ENCOUNTER — RX RENEWAL (OUTPATIENT)
Age: 82
End: 2022-01-11

## 2022-01-11 ENCOUNTER — TRANSCRIPTION ENCOUNTER (OUTPATIENT)
Age: 82
End: 2022-01-11

## 2022-01-12 ENCOUNTER — TRANSCRIPTION ENCOUNTER (OUTPATIENT)
Age: 82
End: 2022-01-12

## 2022-01-14 ENCOUNTER — TRANSCRIPTION ENCOUNTER (OUTPATIENT)
Age: 82
End: 2022-01-14

## 2022-01-18 LAB
ALBUMIN SERPL ELPH-MCNC: 4.4 G/DL
ALP BLD-CCNC: 91 U/L
ALT SERPL-CCNC: 12 U/L
ANION GAP SERPL CALC-SCNC: 15 MMOL/L
AST SERPL-CCNC: 16 U/L
BILIRUB SERPL-MCNC: 0.3 MG/DL
BUN SERPL-MCNC: 26 MG/DL
CALCIUM SERPL-MCNC: 9.7 MG/DL
CHLORIDE SERPL-SCNC: 107 MMOL/L
CHOLEST SERPL-MCNC: 183 MG/DL
CO2 SERPL-SCNC: 22 MMOL/L
CREAT SERPL-MCNC: 0.95 MG/DL
GLUCOSE SERPL-MCNC: 107 MG/DL
HDLC SERPL-MCNC: 50 MG/DL
LDLC SERPL CALC-MCNC: 94 MG/DL
LDLC SERPL DIRECT ASSAY-MCNC: 105 MG/DL
NONHDLC SERPL-MCNC: 133 MG/DL
POTASSIUM SERPL-SCNC: 4.3 MMOL/L
PROT SERPL-MCNC: 6.7 G/DL
SODIUM SERPL-SCNC: 144 MMOL/L
TRIGL SERPL-MCNC: 193 MG/DL

## 2022-01-23 ENCOUNTER — RX RENEWAL (OUTPATIENT)
Age: 82
End: 2022-01-23

## 2022-01-23 ENCOUNTER — TRANSCRIPTION ENCOUNTER (OUTPATIENT)
Age: 82
End: 2022-01-23

## 2022-04-11 ENCOUNTER — TRANSCRIPTION ENCOUNTER (OUTPATIENT)
Age: 82
End: 2022-04-11

## 2022-04-11 RX ORDER — ATORVASTATIN CALCIUM 20 MG/1
20 TABLET, FILM COATED ORAL
Qty: 90 | Refills: 1 | Status: DISCONTINUED | COMMUNITY
Start: 2021-09-17 | End: 2022-04-11

## 2022-05-02 ENCOUNTER — APPOINTMENT (OUTPATIENT)
Dept: INTERNAL MEDICINE | Facility: CLINIC | Age: 82
End: 2022-05-02
Payer: MEDICARE

## 2022-05-02 VITALS
DIASTOLIC BLOOD PRESSURE: 70 MMHG | BODY MASS INDEX: 37.76 KG/M2 | OXYGEN SATURATION: 98 % | SYSTOLIC BLOOD PRESSURE: 140 MMHG | HEIGHT: 61 IN | WEIGHT: 200 LBS | HEART RATE: 79 BPM | TEMPERATURE: 98.2 F

## 2022-05-02 VITALS — SYSTOLIC BLOOD PRESSURE: 130 MMHG | DIASTOLIC BLOOD PRESSURE: 66 MMHG

## 2022-05-02 DIAGNOSIS — R73.09 OTHER ABNORMAL GLUCOSE: ICD-10-CM

## 2022-05-02 LAB
ALBUMIN SERPL ELPH-MCNC: 4.5 G/DL
ALP BLD-CCNC: 74 U/L
ALT SERPL-CCNC: 11 U/L
AST SERPL-CCNC: 17 U/L
BILIRUB DIRECT SERPL-MCNC: 0.1 MG/DL
BILIRUB INDIRECT SERPL-MCNC: 0.3 MG/DL
BILIRUB SERPL-MCNC: 0.4 MG/DL
CHOLEST SERPL-MCNC: 178 MG/DL
ESTIMATED AVERAGE GLUCOSE: 120 MG/DL
HBA1C MFR BLD HPLC: 5.8 %
HDLC SERPL-MCNC: 50 MG/DL
LDLC SERPL CALC-MCNC: 92 MG/DL
LDLC SERPL DIRECT ASSAY-MCNC: 98 MG/DL
NONHDLC SERPL-MCNC: 128 MG/DL
PROT SERPL-MCNC: 6.7 G/DL
T4 FREE SERPL-MCNC: 0.9 NG/DL
TRIGL SERPL-MCNC: 181 MG/DL
TSH SERPL-ACNC: 5.94 UIU/ML

## 2022-05-02 PROCEDURE — 99214 OFFICE O/P EST MOD 30 MIN: CPT

## 2022-05-02 NOTE — HISTORY OF PRESENT ILLNESS
[FreeTextEntry1] : lipid [de-identified] : \par pt accompanied by her daughter, who served as historian. \par lipid- Patient is compliant with diet and medications.  She  has no abdominal pain,  and myalgia 4/30/22- lipids improved\par Patient exercises: no\par IGT- diet reviewed w pt- pt made dietary chg.  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  \par reviewed pulm notes 11/1/21- pulm fibrosis- rec CT\par leg cramps- - 2 /mo\par thickening sigmoid- f/u w GI- Dr. Tim- summer 2021- had seen GI 11/21- rpt CT scan was done as per daughter- nl as per daughter\par GERD- was tx with PPI\par subclinical hypothyroid- labs today.  last labs were 11/6/20\par thyroid nodule - reviewed 4/19/21 US- had FNA by Endo- benign as per daughter. f/u w endo- every 6 mo\par skin CA - last wk seen Derm\par back pain- daughter states she is nerve pinching- had radiation R leg- on gabapentin- given by Ortho\par past 3 mo- in the evening, has to clear her throat\par was alone at home when her daughter works, over past month- started going back to [employer] 1 mo ago

## 2022-05-02 NOTE — PLAN
[FreeTextEntry1] : rec f/u CT chest.  pt to release copy of CT abd and vaccines given at [facility]\par gastritis- decr pantoprazole to 20 mg

## 2022-05-23 NOTE — ED PROVIDER NOTE - DATE/TIME 1
Spoke with patient and she is unable to get away today but will set up a lab appointment for tomorrow.   12-Mar-2019 15:20

## 2022-08-08 ENCOUNTER — TRANSCRIPTION ENCOUNTER (OUTPATIENT)
Age: 82
End: 2022-08-08

## 2022-10-05 ENCOUNTER — APPOINTMENT (OUTPATIENT)
Dept: INTERNAL MEDICINE | Facility: CLINIC | Age: 82
End: 2022-10-05

## 2022-10-05 VITALS — DIASTOLIC BLOOD PRESSURE: 70 MMHG | SYSTOLIC BLOOD PRESSURE: 130 MMHG

## 2022-10-05 VITALS
HEART RATE: 81 BPM | BODY MASS INDEX: 37.76 KG/M2 | HEIGHT: 61 IN | WEIGHT: 200 LBS | DIASTOLIC BLOOD PRESSURE: 70 MMHG | TEMPERATURE: 98.5 F | OXYGEN SATURATION: 94 % | SYSTOLIC BLOOD PRESSURE: 140 MMHG

## 2022-10-05 DIAGNOSIS — Z23 ENCOUNTER FOR IMMUNIZATION: ICD-10-CM

## 2022-10-05 DIAGNOSIS — K13.79 OTHER LESIONS OF ORAL MUCOSA: ICD-10-CM

## 2022-10-05 PROCEDURE — 90662 IIV NO PRSV INCREASED AG IM: CPT

## 2022-10-05 PROCEDURE — 36415 COLL VENOUS BLD VENIPUNCTURE: CPT

## 2022-10-05 PROCEDURE — 99214 OFFICE O/P EST MOD 30 MIN: CPT | Mod: 25

## 2022-10-05 PROCEDURE — G0008: CPT

## 2022-10-05 RX ORDER — VALACYCLOVIR 1 G/1
1 TABLET, FILM COATED ORAL
Qty: 5 | Refills: 5 | Status: ACTIVE | COMMUNITY
Start: 2022-10-05 | End: 1900-01-01

## 2022-10-05 RX ORDER — GABAPENTIN 300 MG/1
300 CAPSULE ORAL
Qty: 90 | Refills: 1 | Status: ACTIVE | COMMUNITY
Start: 2020-02-25 | End: 1900-01-01

## 2022-10-05 NOTE — HISTORY OF PRESENT ILLNESS
[FreeTextEntry1] : IGT [de-identified] : states had pneum vac at [facility]\par pt accompanied by her daughter, who served as historian. \par lipid- Patient is compliant with diet and medications.  She  has no abdominal pain,  \par Patient exercises: no\par IGT- diet reviewed w pt- pt made dietary chg.  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  \par pt c/o occasional sores- lip\par has sore in the mouth for 8 month\par reviewed pulm notes 11/1/21- pulm fibrosis- rec CT\par leg cramps- - 2 /mo\par thickening sigmoid- f/u w GI- Dr. Tim- summer 2021- had seen GI 11/21- rpt CT scan was done as per daughter- nl as per daughter\par GERD- was tx with PPI- decr PPI to  20 mg\par subclinical hypothyroid- labs today.  last labs were 11/6/20\par thyroid nodule - reviewed 4/19/21 US- had FNA by Endo- benign as per daughter. f/u w endo- every 6 mo\par skin CA - last wk seen Derm\par back pain- daughter states she is nerve pinching- had radiation R leg- on gabapentin- given by Ortho\par seasonal allergies- in the evening, has to clear her throat\par was alone at home when her daughter works, over past month- started going back to [employer] 1 mo ago

## 2022-10-06 ENCOUNTER — MED ADMIN CHARGE (OUTPATIENT)
Age: 82
End: 2022-10-06

## 2022-10-06 LAB
ALBUMIN SERPL ELPH-MCNC: 4.5 G/DL
ALP BLD-CCNC: 77 U/L
ALT SERPL-CCNC: 12 U/L
ANION GAP SERPL CALC-SCNC: 14 MMOL/L
AST SERPL-CCNC: 19 U/L
BILIRUB SERPL-MCNC: 0.4 MG/DL
BUN SERPL-MCNC: 23 MG/DL
CALCIUM SERPL-MCNC: 9.9 MG/DL
CHLORIDE SERPL-SCNC: 100 MMOL/L
CHOLEST SERPL-MCNC: 187 MG/DL
CO2 SERPL-SCNC: 25 MMOL/L
CREAT SERPL-MCNC: 0.95 MG/DL
EGFR: 60 ML/MIN/1.73M2
ESTIMATED AVERAGE GLUCOSE: 126 MG/DL
GLUCOSE SERPL-MCNC: 98 MG/DL
HBA1C MFR BLD HPLC: 6 %
HDLC SERPL-MCNC: 48 MG/DL
LDLC SERPL CALC-MCNC: 93 MG/DL
NONHDLC SERPL-MCNC: 138 MG/DL
POTASSIUM SERPL-SCNC: 4.5 MMOL/L
PROT SERPL-MCNC: 7.6 G/DL
SODIUM SERPL-SCNC: 139 MMOL/L
T4 FREE SERPL-MCNC: 1 NG/DL
TRIGL SERPL-MCNC: 228 MG/DL
TSH SERPL-ACNC: 3.74 UIU/ML

## 2023-02-17 ENCOUNTER — TRANSCRIPTION ENCOUNTER (OUTPATIENT)
Age: 83
End: 2023-02-17

## 2023-02-27 ENCOUNTER — TRANSCRIPTION ENCOUNTER (OUTPATIENT)
Age: 83
End: 2023-02-27

## 2023-03-06 LAB
25(OH)D3 SERPL-MCNC: 71.8 NG/ML
ALBUMIN SERPL ELPH-MCNC: 4.3 G/DL
ALP BLD-CCNC: 88 U/L
ALT SERPL-CCNC: 12 U/L
ANION GAP SERPL CALC-SCNC: 13 MMOL/L
AST SERPL-CCNC: 20 U/L
BASOPHILS # BLD AUTO: 0.05 K/UL
BASOPHILS NFR BLD AUTO: 0.8 %
BILIRUB SERPL-MCNC: 0.4 MG/DL
BUN SERPL-MCNC: 22 MG/DL
CALCIUM SERPL-MCNC: 9.5 MG/DL
CHLORIDE SERPL-SCNC: 102 MMOL/L
CHOLEST SERPL-MCNC: 204 MG/DL
CO2 SERPL-SCNC: 26 MMOL/L
CREAT SERPL-MCNC: 1.08 MG/DL
EGFR: 52 ML/MIN/1.73M2
EOSINOPHIL # BLD AUTO: 0.19 K/UL
EOSINOPHIL NFR BLD AUTO: 2.9 %
ESTIMATED AVERAGE GLUCOSE: 123 MG/DL
GLUCOSE SERPL-MCNC: 110 MG/DL
HBA1C MFR BLD HPLC: 5.9 %
HCT VFR BLD CALC: 45.6 %
HDLC SERPL-MCNC: 53 MG/DL
HGB BLD-MCNC: 14.8 G/DL
IMM GRANULOCYTES NFR BLD AUTO: 0.3 %
LDLC SERPL CALC-MCNC: 111 MG/DL
LYMPHOCYTES # BLD AUTO: 1.73 K/UL
LYMPHOCYTES NFR BLD AUTO: 26.7 %
MAN DIFF?: NORMAL
MCHC RBC-ENTMCNC: 30.6 PG
MCHC RBC-ENTMCNC: 32.5 GM/DL
MCV RBC AUTO: 94.2 FL
MONOCYTES # BLD AUTO: 0.58 K/UL
MONOCYTES NFR BLD AUTO: 9 %
NEUTROPHILS # BLD AUTO: 3.9 K/UL
NEUTROPHILS NFR BLD AUTO: 60.3 %
NONHDLC SERPL-MCNC: 151 MG/DL
PLATELET # BLD AUTO: 185 K/UL
POTASSIUM SERPL-SCNC: 4.3 MMOL/L
PROT SERPL-MCNC: 7 G/DL
RBC # BLD: 4.84 M/UL
RBC # FLD: 12.7 %
SODIUM SERPL-SCNC: 141 MMOL/L
T4 FREE SERPL-MCNC: 0.8 NG/DL
TRIGL SERPL-MCNC: 203 MG/DL
TSH SERPL-ACNC: 7.33 UIU/ML
WBC # FLD AUTO: 6.47 K/UL

## 2023-03-07 ENCOUNTER — NON-APPOINTMENT (OUTPATIENT)
Age: 83
End: 2023-03-07

## 2023-03-08 ENCOUNTER — APPOINTMENT (OUTPATIENT)
Dept: INTERNAL MEDICINE | Facility: CLINIC | Age: 83
End: 2023-03-08
Payer: MEDICARE

## 2023-03-08 ENCOUNTER — NON-APPOINTMENT (OUTPATIENT)
Age: 83
End: 2023-03-08

## 2023-03-08 VITALS
DIASTOLIC BLOOD PRESSURE: 70 MMHG | HEART RATE: 76 BPM | SYSTOLIC BLOOD PRESSURE: 132 MMHG | BODY MASS INDEX: 37.19 KG/M2 | HEIGHT: 61 IN | WEIGHT: 197 LBS | OXYGEN SATURATION: 98 %

## 2023-03-08 DIAGNOSIS — C44.90 UNSPECIFIED MALIGNANT NEOPLASM OF SKIN, UNSPECIFIED: ICD-10-CM

## 2023-03-08 DIAGNOSIS — E03.8 OTHER SPECIFIED HYPOTHYROIDISM: ICD-10-CM

## 2023-03-08 DIAGNOSIS — K13.70 UNSPECIFIED LESIONS OF ORAL MUCOSA: ICD-10-CM

## 2023-03-08 PROCEDURE — 99214 OFFICE O/P EST MOD 30 MIN: CPT | Mod: 25

## 2023-03-08 PROCEDURE — G0439: CPT

## 2023-03-08 PROCEDURE — 93000 ELECTROCARDIOGRAM COMPLETE: CPT

## 2023-03-08 RX ORDER — PANTOPRAZOLE 20 MG/1
20 TABLET, DELAYED RELEASE ORAL
Qty: 90 | Refills: 0 | Status: ACTIVE | COMMUNITY
Start: 2020-07-23 | End: 1900-01-01

## 2023-03-08 NOTE — PHYSICAL EXAM
[No Acute Distress] : no acute distress [Well Nourished] : well nourished [Well Developed] : well developed [Well-Appearing] : well-appearing [Normal Sclera/Conjunctiva] : normal sclera/conjunctiva [PERRL] : pupils equal round and reactive to light [Normal Outer Ear/Nose] : the outer ears and nose were normal in appearance [Normal Oropharynx] : the oropharynx was normal [Normal TMs] : both tympanic membranes were normal [No Lymphadenopathy] : no lymphadenopathy [Supple] : supple [No Respiratory Distress] : no respiratory distress  [No Accessory Muscle Use] : no accessory muscle use [Normal Rate] : normal rate  [Regular Rhythm] : with a regular rhythm [Normal S1, S2] : normal S1 and S2 [No Murmur] : no murmur heard [No Carotid Bruits] : no carotid bruits [Pedal Pulses Present] : the pedal pulses are present [No Edema] : there was no peripheral edema [Normal Appearance] : normal in appearance [No Axillary Lymphadenopathy] : no axillary lymphadenopathy [Soft] : abdomen soft [Non Tender] : non-tender [Non-distended] : non-distended [No Masses] : no abdominal mass palpated [Normal Bowel Sounds] : normal bowel sounds [Grossly Normal Strength/Tone] : grossly normal strength/tone [No Focal Deficits] : no focal deficits [Normal Gait] : normal gait [Normal Affect] : the affect was normal [Normal Insight/Judgement] : insight and judgment were intact [de-identified] : inner lip- nodular area-? mucocele [de-identified] : thyroid enlarged [de-identified] : bibasilar rales

## 2023-03-08 NOTE — HISTORY OF PRESENT ILLNESS
[FreeTextEntry1] [facility] : states had pneum vac at [facility].  She will pharmacy for Shingrix and old rec for tdap vac.  got COVID bivalent- 12/2022\par pt accompanied by her daughter, who served as historian. \par lipid- Patient is compliant with diet and medications.  \par Patient exercises: no\par IGT- diet reviewed w pt- pt made dietary chg.  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  \par pt c/o occasional sores- lip- has lesion in the mouth for 1 yr- not improved w valcyclovir\par reviewed pulm notes 11/1/21- pulm fibrosis- rec CT\par leg cramps- - 2 /mo\par thickening sigmoid- f/u w GI- Dr. Tim- summer 2021- had seen GI 11/21- rpt CT scan was done as per daughter- nl as per daughter\par GERD- was tx with PPI- decr PPI to  20 mg\par thyroid nodule - reviewed 4/19/21 US- had FNA by Endo- benign as per daughter. f/u w endo- every 6 mo\par skin CA -  seen Derm-12/2022\par back pain- daughter states she is nerve pinching- had radiation R leg- on gabapentin- given by Ortho\par seasonal allergies- in the evening, has to clear her throat\par was alone at home when her daughter works, over past month- started going back to [employer] 1 mo ago

## 2023-03-08 NOTE — HEALTH RISK ASSESSMENT
[Good] : ~his/her~  mood as  good [Never (0 pts)] : Never (0 points) [No] : In the past 12 months have you used drugs other than those required for medical reasons? No [No falls in past year] : Patient reported no falls in the past year [0] : 2) Feeling down, depressed, or hopeless: Not at all (0) [PHQ-2 Negative - No further assessment needed] : PHQ-2 Negative - No further assessment needed [Patient reported mammogram was normal] : Patient reported mammogram was normal [Patient reported PAP Smear was normal] : Patient reported PAP Smear was normal [Patient reported bone density results were normal] : Patient reported bone density results were normal [Patient reported colonoscopy was abnormal] : Patient reported colonoscopy was abnormal [HIV test declined] : HIV test declined [Hepatitis C test declined] : Hepatitis C test declined [With Family] : lives with family [Retired] : retired [High School] : high school [] :  [Feels Safe at Home] : Feels safe at home [Fully functional (bathing, dressing, toileting, transferring, walking, feeding)] : Fully functional (bathing, dressing, toileting, transferring, walking, feeding) [Fully functional (using the telephone, shopping, preparing meals, housekeeping, doing laundry, using] : Fully functional and needs no help or supervision to perform IADLs (using the telephone, shopping, preparing meals, housekeeping, doing laundry, using transportation, managing medications and managing finances) [Smoke Detector] : smoke detector [Carbon Monoxide Detector] : carbon monoxide detector [Seat Belt] :  uses seat belt [Sunscreen] : uses sunscreen [With Patient/Caregiver] : , with patient/caregiver [Designated Healthcare Proxy] : Designated healthcare proxy [Relationship: ___] : Relationship: [unfilled] [Never] : Never [Audit-CScore] : 0 [[id number]] : 0 [Change in mental status noted] : No change in mental status noted [Sexually Active] : not sexually active [High Risk Behavior] : no high risk behavior [Travel to Developing Areas] : does not  travel to developing areas [MammogramDate] : 01/18 [MammogramComments] : pt doesn't want any further mammo [PapSmearDate] : 01/18 [BoneDensityDate] : 04/21 [ColonoscopyDate] : 01/19 [ColonoscopyComments] : incomplete- diverticuli, polyp=- pt refused colonoscopy, EGD [de-identified] : daughter and Son in law [de-identified] : uses hearing aid [de-identified] : see ophth a few wk ago [de-identified] : seen dentist 2020- sees in [place] [AdvancecareDate] : 03/23

## 2023-04-10 NOTE — ASU PATIENT PROFILE, ADULT - ALCOHOL USE HISTORY SINGLE SELECT
never Clofazimine Counseling:  I discussed with the patient the risks of clofazimine including but not limited to skin and eye pigmentation, liver damage, nausea/vomiting, gastrointestinal bleeding and allergy.

## 2023-05-15 ENCOUNTER — TRANSCRIPTION ENCOUNTER (OUTPATIENT)
Age: 83
End: 2023-05-15

## 2023-05-16 ENCOUNTER — TRANSCRIPTION ENCOUNTER (OUTPATIENT)
Age: 83
End: 2023-05-16

## 2023-05-22 LAB
ALBUMIN SERPL ELPH-MCNC: 4.3 G/DL
ALP BLD-CCNC: 78 U/L
ALT SERPL-CCNC: 13 U/L
ANION GAP SERPL CALC-SCNC: 12 MMOL/L
AST SERPL-CCNC: 23 U/L
BILIRUB SERPL-MCNC: 0.5 MG/DL
BUN SERPL-MCNC: 21 MG/DL
CALCIUM SERPL-MCNC: 9.4 MG/DL
CHLORIDE SERPL-SCNC: 107 MMOL/L
CHOLEST SERPL-MCNC: 211 MG/DL
CO2 SERPL-SCNC: 23 MMOL/L
CREAT SERPL-MCNC: 0.83 MG/DL
EGFR: 71 ML/MIN/1.73M2
ESTIMATED AVERAGE GLUCOSE: 123 MG/DL
GLUCOSE SERPL-MCNC: 109 MG/DL
HBA1C MFR BLD HPLC: 5.9 %
HDLC SERPL-MCNC: 56 MG/DL
LDLC SERPL CALC-MCNC: 112 MG/DL
NONHDLC SERPL-MCNC: 155 MG/DL
POTASSIUM SERPL-SCNC: 4.7 MMOL/L
PROT SERPL-MCNC: 7.2 G/DL
SODIUM SERPL-SCNC: 143 MMOL/L
T4 FREE SERPL-MCNC: 1 NG/DL
TRIGL SERPL-MCNC: 217 MG/DL
TSH SERPL-ACNC: 3.55 UIU/ML

## 2023-05-26 ENCOUNTER — APPOINTMENT (OUTPATIENT)
Dept: INTERNAL MEDICINE | Facility: CLINIC | Age: 83
End: 2023-05-26
Payer: MEDICARE

## 2023-05-26 VITALS
DIASTOLIC BLOOD PRESSURE: 70 MMHG | WEIGHT: 198 LBS | HEART RATE: 71 BPM | BODY MASS INDEX: 37.41 KG/M2 | OXYGEN SATURATION: 99 % | SYSTOLIC BLOOD PRESSURE: 132 MMHG

## 2023-05-26 DIAGNOSIS — R09.89 OTHER SPECIFIED SYMPTOMS AND SIGNS INVOLVING THE CIRCULATORY AND RESPIRATORY SYSTEMS: ICD-10-CM

## 2023-05-26 DIAGNOSIS — B00.1 HERPESVIRAL VESICULAR DERMATITIS: ICD-10-CM

## 2023-05-26 PROCEDURE — 99214 OFFICE O/P EST MOD 30 MIN: CPT

## 2023-05-26 NOTE — COUNSELING
[Potential consequences of obesity discussed] : Potential consequences of obesity discussed [Benefits of weight loss discussed] : Benefits of weight loss discussed [Encouraged to increase physical activity] : Encouraged to increase physical activity declines

## 2023-05-26 NOTE — PLAN
[FreeTextEntry1] : pt to release rec from Ortho, Endo, EGD\par GERD- alt pepcid w PPI\par rec COVID  booster

## 2023-05-26 NOTE — HISTORY OF PRESENT ILLNESS
[FreeTextEntry1] : thyroid, IGT [de-identified] : reviewed 5/20/23 A1c 5.9, , trig 217\par pt accompanied by her daughter, who served as historian. \par lipid- Patient is compliant with diet and medications.  \par IGT- diet reviewed w pt- pt made dietary chg.  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  reviewed pulm notes 11/1/21- pulm fibrosis- rec CT- pt not sharan it\par pt c/o occasional sores- lip- has lesion in the mouth for 1 yr- but do resolve\par leg cramps- - 2 /mo\par thickening sigmoid- f/u w GI- Dr. Tim- summer 2021- had seen GI 11/21- rpt CT scan was done as per daughter- nl as per daughter\par GERD- controlled was tx with PPI- alt PPI   20 mg w pepcid but pt is not taking pepcid- as per pt , pt had EGD 2 yr ago by Dr. Godinez\par thyroid nodule - reviewed 4/19/21 US- had FNA by Endo- benign as per daughter. will f/u w endo- \par back pain- daughter states she is nerve pinching- had radiation R leg- on gabapentin- given by Ortho- has appt w [facility]- getting PT\par seasonal allergies- in the evening, has to clear her throat\par was alone at home when her daughter works, over past month- started going back to [employer] 1 mo ago

## 2023-06-01 ENCOUNTER — TRANSCRIPTION ENCOUNTER (OUTPATIENT)
Age: 83
End: 2023-06-01

## 2023-07-10 ENCOUNTER — NON-APPOINTMENT (OUTPATIENT)
Age: 83
End: 2023-07-10

## 2023-07-11 ENCOUNTER — TRANSCRIPTION ENCOUNTER (OUTPATIENT)
Age: 83
End: 2023-07-11

## 2023-07-20 ENCOUNTER — NON-APPOINTMENT (OUTPATIENT)
Age: 83
End: 2023-07-20

## 2023-07-24 NOTE — H&P PST ADULT - PRESSURE ULCER(S)
OUMAR GATICA  [id number]    Date of Consult:  7/24/23  CHIEF COMPLAINT:  cp  HISTORY OF PRESENT ILLNESS:  61F with HTN HLD DM op/w cp x1 day. pt states acute onset chest pressure while gardening. pain worse with exertion, improved with rest. 8/10, radiating to back and neck. also with sob. pt comes in, EKG with ST changes inferiorly and [place]rending [place] [id number] consistent with NSTEMI. pt on heparin drip, which improved her symptoms. pt denies prev cardiac issues.   pt follows outpt with dr truong, states stress test last year was normal.     Allergies    No Known Allergies    Intolerances    	    MEDICATIONS:  aspirin  chewable 162 milliGRAM(s) Oral once  aspirin  chewable 81 milliGRAM(s) Oral daily  clopidogrel Tablet 300 milliGRAM(s) Oral once  heparin   Injectable 5600 Unit(s) IV Push every 6 hours PRN  heparin  Infusion.  Unit(s)/Hr IV Continuous <Continuous>  hydrochlorothiazide 25 milliGRAM(s) Oral daily  losartan 100 milliGRAM(s) Oral daily  metoprolol succinate ER 50 milliGRAM(s) Oral daily  nitroglycerin     SubLingual 0.4 milliGRAM(s) SubLingual every 5 minutes PRN        melatonin 3 milliGRAM(s) Oral at bedtime PRN  ondansetron Injectable 4 milliGRAM(s) IV Push every 8 hours PRN  oxycodone    5 mG/acetaminophen 325 mG 1 Tablet(s) Oral every 4 hours PRN    aluminum hydroxide/magnesium hydroxide/simethicone Suspension 30 milliLiter(s) Oral every 4 hours PRN  pantoprazole  Injectable 40 milliGRAM(s) IV Push daily    atorvastatin 40 milliGRAM(s) Oral at bedtime  dextrose 50% Injectable 25 Gram(s) IV Push once  dextrose 50% Injectable 12.5 Gram(s) IV Push once  dextrose 50% Injectable 25 Gram(s) IV Push once  dextrose Oral Gel 15 Gram(s) Oral once PRN  glucagon  Injectable 1 milliGRAM(s) IntraMuscular once  insulin lispro (ADMELOG) corrective regimen sliding scale   SubCutaneous three times a day before meals    dextrose 5%. 1000 milliLiter(s) IV Continuous <Continuous>  dextrose 5%. 1000 milliLiter(s) IV Continuous <Continuous>  sodium chloride 0.9%. 1000 milliLiter(s) IV Continuous <Continuous>      PAST MEDICAL & SURGICAL HISTORY:  HTN (hypertension)      DM (diabetes mellitus)      HLD (hyperlipidemia)      Hypothyroidism      S/P hysterectomy          FAMILY HISTORY:      SOCIAL HISTORY:    denies tob, etoh, drugs      REVIEW OF SYSTEMS:  See HPI. Otherwise, 10 point ROS done and otherwise negative.    PHYSICAL EXAM:  T(C): 36.6 (07-24-23 @ 11:20), Max: 37.1 (07-23-23 @ 16:26)  HR: 65 (07-24-23 @ 11:20) (58 - 75)  BP: 160/80 (07-24-23 @ 11:20) (146/71 - 205/84)  RR: 18 (07-24-23 @ 11:20) (14 - 18)  SpO2: 95% (07-24-23 @ 11:20) (95% - 98%)  Wt(kg): --  I&O's Summary      Appearance: Normal	  HEENT:   Normal oral mucosa, PERRL, EOMI	  Lymphatic: No lymphadenopathy  Cardiovascular: Normal S1 S2, No JVD, No murmurs, No edema  Respiratory: Lungs clear to auscultation	  Psychiatry: A & O x 3, Mood & affect appropriate  Gastrointestinal:  Soft, Non-tender, + BS	  Skin: No rashes, No ecchymoses, No cyanosis	  Neurologic: Non-focal  Extremities: Normal range of motion, No clubbing, cyanosis       LABS:	 	    CBC Full  -  ( 24 Jul 2023 04:23 )  WBC Count : 7.35 K/uL  Hemoglobin : 12.1 g/dL  Hematocrit : 36.8 %  Platelet Count - Automated : 153 K/uL  Mean Cell Volume : 87.6 fl  Mean Cell Hemoglobin : 28.8 pg  Mean Cell Hemoglobin Concentration : 32.9 g/dL  Auto Neutrophil # : x  Auto Lymphocyte # : x  Auto Monocyte # : x  Auto Eosinophil # : x  Auto Basophil # : x  Auto Neutrophil % : x  Auto Lymphocyte % : x  Auto Monocyte % : x  Auto Eosinophil % : x  Auto Basophil % : x    07-24    142  |  108  |  11  ----------------------------<  140<H>  3.2<L>   |  27  |  0.88  07-23    140  |  106  |  14  ----------------------------<  108<H>  3.6   |  28  |  0.92    Ca    9.7      24 Jul 2023 04:23  Ca    9.9      23 Jul 2023 19:49  Mg     2.1     07-23    TPro  8.0  /  Alb  3.7  /  TBili  0.4  /  DBili  x   /  AST  58<H>  /  ALT  60  /  AlkPhos  56  07-23      proBNP:   Lipid Profile:   HgA1c:   TSH: Thyroid Stimulating Hormone, Serum: 5.600 uU/mL (07-24 @ 04:23)        CARDIAC MARKERS:            TELEMETRY: 	    ECG:  	  RADIOLOGY:  OTHER: 	    PREVIOUS DIAGNOSTIC TESTING:    [ ] Echocardiogram:  [ ]  Catheterization:  [ ] Stress Test:  	  	  ASSESSMENT/PLAN: 	    Harry Rich MD    
no

## 2023-10-12 ENCOUNTER — RX RENEWAL (OUTPATIENT)
Age: 83
End: 2023-10-12

## 2023-10-18 ENCOUNTER — TRANSCRIPTION ENCOUNTER (OUTPATIENT)
Age: 83
End: 2023-10-18

## 2023-10-28 ENCOUNTER — TRANSCRIPTION ENCOUNTER (OUTPATIENT)
Age: 83
End: 2023-10-28

## 2023-12-08 ENCOUNTER — RX RENEWAL (OUTPATIENT)
Age: 83
End: 2023-12-08

## 2023-12-31 LAB
ALBUMIN SERPL ELPH-MCNC: 4.5 G/DL
ALP BLD-CCNC: 423 U/L
ALT SERPL-CCNC: 65 U/L
ANION GAP SERPL CALC-SCNC: 13 MMOL/L
AST SERPL-CCNC: 46 U/L
BILIRUB SERPL-MCNC: 0.5 MG/DL
BUN SERPL-MCNC: 20 MG/DL
CALCIUM SERPL-MCNC: 9.8 MG/DL
CHLORIDE SERPL-SCNC: 102 MMOL/L
CHOLEST SERPL-MCNC: 205 MG/DL
CO2 SERPL-SCNC: 26 MMOL/L
CREAT SERPL-MCNC: 0.95 MG/DL
EGFR: 60 ML/MIN/1.73M2
ESTIMATED AVERAGE GLUCOSE: 120 MG/DL
GLUCOSE SERPL-MCNC: 98 MG/DL
HBA1C MFR BLD HPLC: 5.8 %
HDLC SERPL-MCNC: 56 MG/DL
LDLC SERPL CALC-MCNC: 103 MG/DL
NONHDLC SERPL-MCNC: 149 MG/DL
POTASSIUM SERPL-SCNC: 4.7 MMOL/L
PROT SERPL-MCNC: 7.2 G/DL
SODIUM SERPL-SCNC: 142 MMOL/L
T4 FREE SERPL-MCNC: 1.1 NG/DL
TRIGL SERPL-MCNC: 268 MG/DL
TSH SERPL-ACNC: 3.64 UIU/ML

## 2024-01-04 ENCOUNTER — LABORATORY RESULT (OUTPATIENT)
Age: 84
End: 2024-01-04

## 2024-01-04 ENCOUNTER — APPOINTMENT (OUTPATIENT)
Dept: ULTRASOUND IMAGING | Facility: IMAGING CENTER | Age: 84
End: 2024-01-04
Payer: MEDICARE

## 2024-01-04 ENCOUNTER — APPOINTMENT (OUTPATIENT)
Dept: INTERNAL MEDICINE | Facility: CLINIC | Age: 84
End: 2024-01-04
Payer: MEDICARE

## 2024-01-04 ENCOUNTER — OUTPATIENT (OUTPATIENT)
Dept: OUTPATIENT SERVICES | Facility: HOSPITAL | Age: 84
LOS: 1 days | End: 2024-01-04
Payer: MEDICARE

## 2024-01-04 VITALS
OXYGEN SATURATION: 97 % | SYSTOLIC BLOOD PRESSURE: 154 MMHG | WEIGHT: 194 LBS | HEIGHT: 61 IN | BODY MASS INDEX: 36.63 KG/M2 | HEART RATE: 73 BPM | DIASTOLIC BLOOD PRESSURE: 73 MMHG | TEMPERATURE: 98 F

## 2024-01-04 VITALS — SYSTOLIC BLOOD PRESSURE: 140 MMHG | DIASTOLIC BLOOD PRESSURE: 76 MMHG

## 2024-01-04 DIAGNOSIS — Z98.890 OTHER SPECIFIED POSTPROCEDURAL STATES: Chronic | ICD-10-CM

## 2024-01-04 DIAGNOSIS — R79.89 OTHER SPECIFIED ABNORMAL FINDINGS OF BLOOD CHEMISTRY: ICD-10-CM

## 2024-01-04 DIAGNOSIS — M79.601 PAIN IN RIGHT ARM: ICD-10-CM

## 2024-01-04 DIAGNOSIS — K29.70 GASTRITIS, UNSPECIFIED, W/OUT BLEEDING: ICD-10-CM

## 2024-01-04 DIAGNOSIS — Z90.49 ACQUIRED ABSENCE OF OTHER SPECIFIED PARTS OF DIGESTIVE TRACT: Chronic | ICD-10-CM

## 2024-01-04 DIAGNOSIS — R03.0 ELEVATED BLOOD-PRESSURE READING, W/OUT DIAGNOSIS OF HYPERTENSION: ICD-10-CM

## 2024-01-04 PROCEDURE — 99214 OFFICE O/P EST MOD 30 MIN: CPT

## 2024-01-04 PROCEDURE — 76700 US EXAM ABDOM COMPLETE: CPT | Mod: 26

## 2024-01-04 PROCEDURE — 76700 US EXAM ABDOM COMPLETE: CPT

## 2024-01-04 PROCEDURE — G2211 COMPLEX E/M VISIT ADD ON: CPT

## 2024-01-04 PROCEDURE — 36415 COLL VENOUS BLD VENIPUNCTURE: CPT

## 2024-01-04 NOTE — PLAN
[FreeTextEntry1] : thyroid mass- She will send me copy of thyroid bx and CT colonoscopy.  Blood was collected in the office. R arm - heat, diclofenac cream- f/u if not better - will xray elev LFT- ER- if abdominal pain, nausea, vomiting

## 2024-01-04 NOTE — HISTORY OF PRESENT ILLNESS
[FreeTextEntry1] : thyroid, IGT [de-identified] : reviewed 12/30/23 labs- A1c 5.8, triglycerides 268, elev LFT elev LFT- reviewed 1/4/24 US abd- no evid of stone, CBD 12 mm- pt denies any abd pain, nausea, vomiting, fever basilar pulm fibrosis- reviewed CT chest  7/8/23-3.8 cm thyroid mass, mild ILD, CBD stone, dilated CBD- rec FNA  reviewed Pulm note 4/16/21- rec rpt PFT 6 mo, rpt CT 1 yr.  reviewed pulm notes 11/1/21- pulm fibrosis- rec CT- pt not sharan it thyroid mass- was bx several yrs ago and was benign.  She will send me copy of thyroid bx.  Endo that she was seeing is no longer working w [facility] pt fell around Thanksgiving and was lifted up by her R arm- still has some R arm discomfort - only at night pt accompanied by her daughter, who served as historian.  lipid- Patient is compliant with diet and medications.   IGT- diet reviewed w pt- pt made dietary chg.  - lost wgt- b/c she doesn't like food at [facility]  hypothyr- reviewed nl thyroid labs12/3023 obesity- using cane so limited ambulation leg cramps- - better thickening sigmoid- f/u w GI- Dr. Tim- summer 2021- had seen GI 11/21- rpt CT scan was done as per daughter- nl as per daughter GERD- controlled was tx with PPI- alt PPI   20 mg w pepcid but pt is not taking pepcid- as per pt , pt had EGD 2 yr ago by Dr. Godinez thyroid nodule - reviewed 4/19/21 US- had FNA by Endo- benign as per daughter. will f/u w endo-  back pain- daughter states she is nerve pinching- had radiation R leg- on gabapentin- given by Ortho- has appt w [facility]- getting PT

## 2024-01-04 NOTE — PHYSICAL EXAM
[de-identified] : good ROM of b/l shoulders - no pain.  no tenderness of R Upper arm- no swelling

## 2024-01-08 ENCOUNTER — NON-APPOINTMENT (OUTPATIENT)
Age: 84
End: 2024-01-08

## 2024-01-08 LAB
ALBUMIN SERPL ELPH-MCNC: 4.4 G/DL
ALP BLD-CCNC: 414 U/L
ALT SERPL-CCNC: 111 U/L
AST SERPL-CCNC: 55 U/L
BILIRUB DIRECT SERPL-MCNC: 0.1 MG/DL
BILIRUB INDIRECT SERPL-MCNC: 0.2 MG/DL
BILIRUB SERPL-MCNC: 0.3 MG/DL
HAV IGM SER QL: NONREACTIVE
HBV CORE IGG+IGM SER QL: NONREACTIVE
HBV SURFACE AB SER QL: NONREACTIVE
HBV SURFACE AG SER QL: NONREACTIVE
HCV AB SER QL: NONREACTIVE
HCV S/CO RATIO: 0.15 S/CO
PROT SERPL-MCNC: 7.9 G/DL

## 2024-01-17 ENCOUNTER — TRANSCRIPTION ENCOUNTER (OUTPATIENT)
Age: 84
End: 2024-01-17

## 2024-02-26 ENCOUNTER — APPOINTMENT (OUTPATIENT)
Dept: INTERNAL MEDICINE | Facility: CLINIC | Age: 84
End: 2024-02-26
Payer: MEDICARE

## 2024-02-26 ENCOUNTER — NON-APPOINTMENT (OUTPATIENT)
Age: 84
End: 2024-02-26

## 2024-02-26 VITALS
HEIGHT: 61 IN | DIASTOLIC BLOOD PRESSURE: 70 MMHG | WEIGHT: 197 LBS | OXYGEN SATURATION: 98 % | TEMPERATURE: 97 F | HEART RATE: 75 BPM | SYSTOLIC BLOOD PRESSURE: 145 MMHG | BODY MASS INDEX: 37.19 KG/M2

## 2024-02-26 VITALS — DIASTOLIC BLOOD PRESSURE: 54 MMHG | SYSTOLIC BLOOD PRESSURE: 120 MMHG

## 2024-02-26 DIAGNOSIS — E66.9 OBESITY, UNSPECIFIED: ICD-10-CM

## 2024-02-26 DIAGNOSIS — H26.9 UNSPECIFIED CATARACT: ICD-10-CM

## 2024-02-26 PROCEDURE — 99214 OFFICE O/P EST MOD 30 MIN: CPT

## 2024-02-26 PROCEDURE — 93000 ELECTROCARDIOGRAM COMPLETE: CPT

## 2024-02-26 PROCEDURE — G2211 COMPLEX E/M VISIT ADD ON: CPT

## 2024-02-26 NOTE — PHYSICAL EXAM
[No Acute Distress] : no acute distress [Well Nourished] : well nourished [Well Developed] : well developed [Well-Appearing] : well-appearing [Normal Sclera/Conjunctiva] : normal sclera/conjunctiva [PERRL] : pupils equal round and reactive to light [Normal Outer Ear/Nose] : the outer ears and nose were normal in appearance [Normal Oropharynx] : the oropharynx was normal [Normal TMs] : both tympanic membranes were normal [No JVD] : no jugular venous distention [No Lymphadenopathy] : no lymphadenopathy [Thyroid Normal, No Nodules] : the thyroid was normal and there were no nodules present [Supple] : supple [No Respiratory Distress] : no respiratory distress  [No Accessory Muscle Use] : no accessory muscle use [Clear to Auscultation] : lungs were clear to auscultation bilaterally [Normal Rate] : normal rate  [Regular Rhythm] : with a regular rhythm [Normal S1, S2] : normal S1 and S2 [No Murmur] : no murmur heard [Soft] : abdomen soft [Non Tender] : non-tender [Non-distended] : non-distended [No Masses] : no abdominal mass palpated [No HSM] : no HSM [Normal Bowel Sounds] : normal bowel sounds [Normal Posterior Cervical Nodes] : no posterior cervical lymphadenopathy [Normal Anterior Cervical Nodes] : no anterior cervical lymphadenopathy [Grossly Normal Strength/Tone] : grossly normal strength/tone [No Focal Deficits] : no focal deficits [Normal Gait] : normal gait [Normal Affect] : the affect was normal [Normal Insight/Judgement] : insight and judgment were intact [de-identified] : good ROM of b/l shoulders - no pain.  no tenderness of R Upper arm- no swelling

## 2024-02-26 NOTE — PHYSICAL EXAM
[No Acute Distress] : no acute distress [Well Nourished] : well nourished [Well Developed] : well developed [Well-Appearing] : well-appearing [Normal Sclera/Conjunctiva] : normal sclera/conjunctiva [PERRL] : pupils equal round and reactive to light [Normal Outer Ear/Nose] : the outer ears and nose were normal in appearance [Normal Oropharynx] : the oropharynx was normal [Normal TMs] : both tympanic membranes were normal [No JVD] : no jugular venous distention [No Lymphadenopathy] : no lymphadenopathy [Thyroid Normal, No Nodules] : the thyroid was normal and there were no nodules present [Supple] : supple [No Respiratory Distress] : no respiratory distress  [No Accessory Muscle Use] : no accessory muscle use [Clear to Auscultation] : lungs were clear to auscultation bilaterally [Regular Rhythm] : with a regular rhythm [Normal Rate] : normal rate  [Normal S1, S2] : normal S1 and S2 [No Murmur] : no murmur heard [Soft] : abdomen soft [Non Tender] : non-tender [Non-distended] : non-distended [No Masses] : no abdominal mass palpated [No HSM] : no HSM [Normal Bowel Sounds] : normal bowel sounds [Normal Posterior Cervical Nodes] : no posterior cervical lymphadenopathy [Normal Anterior Cervical Nodes] : no anterior cervical lymphadenopathy [Grossly Normal Strength/Tone] : grossly normal strength/tone [No Focal Deficits] : no focal deficits [Normal Gait] : normal gait [Normal Affect] : the affect was normal [Normal Insight/Judgement] : insight and judgment were intact [de-identified] : good ROM of b/l shoulders - no pain.  no tenderness of R Upper arm- no swelling

## 2024-02-27 LAB
ALBUMIN SERPL ELPH-MCNC: 4.4 G/DL
ALP BLD-CCNC: 73 U/L
ALT SERPL-CCNC: 12 U/L
ANION GAP SERPL CALC-SCNC: 11 MMOL/L
AST SERPL-CCNC: 19 U/L
BASOPHILS # BLD AUTO: 0.04 K/UL
BASOPHILS NFR BLD AUTO: 0.6 %
BILIRUB SERPL-MCNC: 0.3 MG/DL
BUN SERPL-MCNC: 22 MG/DL
CALCIUM SERPL-MCNC: 9.2 MG/DL
CHLORIDE SERPL-SCNC: 104 MMOL/L
CO2 SERPL-SCNC: 25 MMOL/L
CREAT SERPL-MCNC: 0.96 MG/DL
EGFR: 59 ML/MIN/1.73M2
EOSINOPHIL # BLD AUTO: 0.2 K/UL
EOSINOPHIL NFR BLD AUTO: 2.8 %
GLUCOSE SERPL-MCNC: 92 MG/DL
HCT VFR BLD CALC: 41.2 %
HGB BLD-MCNC: 14.2 G/DL
IMM GRANULOCYTES NFR BLD AUTO: 0.3 %
LYMPHOCYTES # BLD AUTO: 2.11 K/UL
LYMPHOCYTES NFR BLD AUTO: 29.8 %
MAN DIFF?: NORMAL
MCHC RBC-ENTMCNC: 31.3 PG
MCHC RBC-ENTMCNC: 34.5 GM/DL
MCV RBC AUTO: 90.9 FL
MONOCYTES # BLD AUTO: 0.66 K/UL
MONOCYTES NFR BLD AUTO: 9.3 %
NEUTROPHILS # BLD AUTO: 4.06 K/UL
NEUTROPHILS NFR BLD AUTO: 57.2 %
PLATELET # BLD AUTO: 176 K/UL
POTASSIUM SERPL-SCNC: 4.2 MMOL/L
PROT SERPL-MCNC: 6.9 G/DL
RBC # BLD: 4.53 M/UL
RBC # FLD: 12.8 %
SODIUM SERPL-SCNC: 139 MMOL/L
WBC # FLD AUTO: 7.09 K/UL

## 2024-03-01 NOTE — PLAN
[FreeTextEntry1] : non fasting labs- Blood was collected in the office. EKG- NSR 61 pt can take Pantoprazole, levothyroxine on AM of surgrey.  stop all over the counter medd 1 wk prior to surg ILD- see Pulm , PFT prior to surgery pt can proceed with procedure once cleared by Pulm

## 2024-03-01 NOTE — HISTORY OF PRESENT ILLNESS
[FreeTextEntry2] : 3./12/24 [FreeTextEntry3] : Dr. Cutler [FreeTextEntry4] : Use of NSAID/ ASA- yes - ASA steroid use within the past 6 mo-no  hx of anesthesia reaction-no  Fhx:anesthesia reaction-no           walking up a flight of stairs-no CP or SOB.  walking 1 mile- no CP or SOB  [FreeTextEntry1] : thyroid, IGT [de-identified] : reviewed 12/30/23 labs- A1c 5.8, triglycerides 268, elev LFT elev LFT- reviewed 1/4/24 US abd- no evid of stone, CBD 12 mm- pt denies any abd pain, nausea, vomiting, fever reviewed 1/27/24 [facility] - common bile duct stone.  mod dilatation intrahepatic biliary ducts.  pt asympt now.  reviewed 1/127/24 labs- nl CMP, CBC, thyroid labs, A1c basilar pulm fibrosis- reviewed CT chest  7/8/23-3.8 cm thyroid mass, mild ILD, CBD stone, dilated CBD- rec FNA  reviewed Pulm note 4/16/21- rec rpt PFT 6 mo, rpt CT 1 yr.  reviewed pulm notes 11/1/21- pulm fibrosis- rec CT- pt not sharan it thyroid mass- was bx several yrs ago and was benign.  She will send me copy of thyroid bx.  Endo that she was seeing is no longer working w [employer].  reivewed thyroid bx  pt accompanied by her daughter, who served as historian.  lipid- Patient is compliant with diet and medications.   IGT- diet reviewed w pt- pt made dietary chg.  - lost wgt- b/c she doesn't like food at [facility]  hypothyr- reviewed nl thyroid labs12/3023 obesity- using cane so limited ambulation thickening sigmoid- f/u w GI- Dr. Godinez- 1/25/24- rpt CT scan was done as per daughter- nl as per daughter GERD- controlled was tx with PPI- alt PPI   20 mg w pepcid but pt is not taking pepcid- as per pt , pt had EGD 2 yr ago by Dr. Godinez thyroid nodule - reviewed 4/19/21 US- had FNA by Endo- benign as per daughter. will f/u w endo- reviewed bx 5/28/21 follicular lesion of undetermined sigificance back pain- daughter states she is nerve pinching- had radiation R leg- on gabapentin- given by Ortho- has appt w [facility]- getting PT no snoring

## 2024-03-01 NOTE — REVIEW OF SYSTEMS
[FreeTextEntry1] : see hpi [Earache] : no earache [Fever] : no fever [Sore Throat] : no sore throat [Chest Pain] : no chest pain [Lower Ext Edema] : no lower extremity edema [Palpitations] : no palpitations [Shortness Of Breath] : no shortness of breath [Wheezing] : no wheezing [Cough] : no cough [Abdominal Pain] : no abdominal pain [Dyspnea on Exertion] : no dyspnea on exertion [Nausea] : no nausea [Diarrhea] : diarrhea [Vomiting] : no vomiting [Dysuria] : no dysuria [Dizziness] : no dizziness

## 2024-03-01 NOTE — REVIEW OF SYSTEMS
[FreeTextEntry1] : see hpi [Fever] : no fever [Earache] : no earache [Sore Throat] : no sore throat [Chest Pain] : no chest pain [Palpitations] : no palpitations [Lower Ext Edema] : no lower extremity edema [Shortness Of Breath] : no shortness of breath [Wheezing] : no wheezing [Cough] : no cough [Dyspnea on Exertion] : no dyspnea on exertion [Abdominal Pain] : no abdominal pain [Nausea] : no nausea [Diarrhea] : diarrhea [Vomiting] : no vomiting [Dysuria] : no dysuria [Dizziness] : no dizziness

## 2024-03-01 NOTE — HISTORY OF PRESENT ILLNESS
[FreeTextEntry2] : 3./12/24 [FreeTextEntry3] : Dr. Cutler [FreeTextEntry4] : Use of NSAID/ ASA- yes - ASA steroid use within the past 6 mo-no  hx of anesthesia reaction-no  Fhx:anesthesia reaction-no           walking up a flight of stairs-no CP or SOB.  walking 1 mile- no CP or SOB  [FreeTextEntry1] : thyroid, IGT [de-identified] : reviewed 12/30/23 labs- A1c 5.8, triglycerides 268, elev LFT elev LFT- reviewed 1/4/24 US abd- no evid of stone, CBD 12 mm- pt denies any abd pain, nausea, vomiting, fever reviewed 1/27/24 MRIRCP - common bile duct stone.  mod dilatation intrahepatic biliary ducts.  pt asympt now.  reviewed 1/127/24 labs- nl CMP, CBC, thyroid labs, A1c basilar pulm fibrosis- reviewed CT chest  7/8/23-3.8 cm thyroid mass, mild ILD, CBD stone, dilated CBD- rec FNA  reviewed Pulm note 4/16/21- rec rpt PFT 6 mo, rpt CT 1 yr.  reviewed pulm notes 11/1/21- pulm fibrosis- rec CT- pt not sharan it thyroid mass- was bx several yrs ago and was benign.  She will send me copy of thyroid bx.  Endo that she was seeing is no longer working w [facility].  reivewed thyroid bx  pt accompanied by her daughter, who served as historian.  lipid- Patient is compliant with diet and medications.   IGT- diet reviewed w pt- pt made dietary chg.  - lost wgt- b/c she doesn't like food at [facility]  hypothyr- reviewed nl thyroid labs12/3023 obesity- using cane so limited ambulation thickening sigmoid- f/u w GI- Dr. Godinez- 1/25/24- rpt CT scan was done as per daughter- nl as per daughter GERD- controlled was tx with PPI- alt PPI   20 mg w pepcid but pt is not taking pepcid- as per pt , pt had EGD 2 yr ago by Dr. Godinez thyroid nodule - reviewed 4/19/21 US- had FNA by Endo- benign as per daughter. will f/u w endo- reviewed bx 5/28/21 follicular lesion of undetermined sigificance back pain- daughter states she is nerve pinching- had radiation R leg- on gabapentin- given by Ortho- has appt w [facility]- getting PT no snoring

## 2024-03-04 ENCOUNTER — APPOINTMENT (OUTPATIENT)
Dept: PULMONOLOGY | Facility: CLINIC | Age: 84
End: 2024-03-04
Payer: MEDICARE

## 2024-03-04 VITALS
HEART RATE: 61 BPM | WEIGHT: 195 LBS | BODY MASS INDEX: 36.82 KG/M2 | HEIGHT: 61 IN | DIASTOLIC BLOOD PRESSURE: 77 MMHG | SYSTOLIC BLOOD PRESSURE: 136 MMHG

## 2024-03-04 PROCEDURE — 99214 OFFICE O/P EST MOD 30 MIN: CPT | Mod: 25

## 2024-03-04 PROCEDURE — 94729 DIFFUSING CAPACITY: CPT

## 2024-03-04 PROCEDURE — 94726 PLETHYSMOGRAPHY LUNG VOLUMES: CPT

## 2024-03-04 PROCEDURE — ZZZZZ: CPT

## 2024-03-04 PROCEDURE — 94010 BREATHING CAPACITY TEST: CPT

## 2024-03-04 NOTE — HISTORY OF PRESENT ILLNESS
[TextBox_4] : Pt here for follow up pulmonary fibrosis. Denies cough , SOB since last visit.  Planning cataract surgery. CT 7/2023 unchanged. PFTs today unchanged.

## 2024-03-04 NOTE — ASSESSMENT
[FreeTextEntry1] : PFTs unchanged/ normal.(adjusting for technique of diffusing capacity)  Remains asymptomatic.Will continue to monitor with yearly PFTs. Cleared from pulmonary standpoint for proposed cataract surgery.

## 2024-03-08 ENCOUNTER — RX RENEWAL (OUTPATIENT)
Age: 84
End: 2024-03-08

## 2024-03-08 RX ORDER — FAMOTIDINE 40 MG/1
40 TABLET, FILM COATED ORAL
Qty: 90 | Refills: 0 | Status: ACTIVE | COMMUNITY
Start: 2023-03-08 | End: 1900-01-01

## 2024-05-09 ENCOUNTER — TRANSCRIPTION ENCOUNTER (OUTPATIENT)
Age: 84
End: 2024-05-09

## 2024-05-09 RX ORDER — ROSUVASTATIN CALCIUM 5 MG/1
5 TABLET, FILM COATED ORAL
Qty: 90 | Refills: 3 | Status: ACTIVE | COMMUNITY
Start: 2021-10-11 | End: 1900-01-01

## 2024-06-05 ENCOUNTER — TRANSCRIPTION ENCOUNTER (OUTPATIENT)
Age: 84
End: 2024-06-05

## 2024-06-10 LAB
25(OH)D3 SERPL-MCNC: 62.8 NG/ML
ALBUMIN SERPL ELPH-MCNC: 4.4 G/DL
ALP BLD-CCNC: 175 U/L
ALT SERPL-CCNC: 100 U/L
ANION GAP SERPL CALC-SCNC: 12 MMOL/L
AST SERPL-CCNC: 284 U/L
BASOPHILS # BLD AUTO: 0.03 K/UL
BASOPHILS NFR BLD AUTO: 0.4 %
BILIRUB SERPL-MCNC: 0.8 MG/DL
BUN SERPL-MCNC: 21 MG/DL
CALCIUM SERPL-MCNC: 9.6 MG/DL
CHLORIDE SERPL-SCNC: 104 MMOL/L
CHOLEST SERPL-MCNC: 207 MG/DL
CO2 SERPL-SCNC: 24 MMOL/L
CREAT SERPL-MCNC: 0.92 MG/DL
EGFR: 62 ML/MIN/1.73M2
EOSINOPHIL # BLD AUTO: 0.12 K/UL
EOSINOPHIL NFR BLD AUTO: 1.7 %
ESTIMATED AVERAGE GLUCOSE: 120 MG/DL
GLUCOSE SERPL-MCNC: 102 MG/DL
HBA1C MFR BLD HPLC: 5.8 %
HCT VFR BLD CALC: 42.8 %
HDLC SERPL-MCNC: 58 MG/DL
HGB BLD-MCNC: 14.3 G/DL
IMM GRANULOCYTES NFR BLD AUTO: 0.4 %
LDLC SERPL CALC-MCNC: 104 MG/DL
LDLC SERPL DIRECT ASSAY-MCNC: 114 MG/DL
LYMPHOCYTES # BLD AUTO: 1.2 K/UL
LYMPHOCYTES NFR BLD AUTO: 16.5 %
MAN DIFF?: NORMAL
MCHC RBC-ENTMCNC: 30.4 PG
MCHC RBC-ENTMCNC: 33.4 GM/DL
MCV RBC AUTO: 91.1 FL
MONOCYTES # BLD AUTO: 0.64 K/UL
MONOCYTES NFR BLD AUTO: 8.8 %
NEUTROPHILS # BLD AUTO: 5.25 K/UL
NEUTROPHILS NFR BLD AUTO: 72.2 %
NONHDLC SERPL-MCNC: 149 MG/DL
PLATELET # BLD AUTO: 184 K/UL
POTASSIUM SERPL-SCNC: 4.4 MMOL/L
PROT SERPL-MCNC: 7 G/DL
RBC # BLD: 4.7 M/UL
RBC # FLD: 12.3 %
SODIUM SERPL-SCNC: 140 MMOL/L
T4 FREE SERPL-MCNC: 1.2 NG/DL
TRIGL SERPL-MCNC: 267 MG/DL
TSH SERPL-ACNC: 3.53 UIU/ML
WBC # FLD AUTO: 7.27 K/UL

## 2024-06-11 ENCOUNTER — APPOINTMENT (OUTPATIENT)
Dept: INTERNAL MEDICINE | Facility: CLINIC | Age: 84
End: 2024-06-11

## 2024-06-11 VITALS
HEART RATE: 70 BPM | OXYGEN SATURATION: 97 % | DIASTOLIC BLOOD PRESSURE: 72 MMHG | TEMPERATURE: 97.6 F | BODY MASS INDEX: 37.57 KG/M2 | WEIGHT: 199 LBS | SYSTOLIC BLOOD PRESSURE: 124 MMHG | HEIGHT: 61 IN

## 2024-06-11 DIAGNOSIS — E04.1 NONTOXIC SINGLE THYROID NODULE: ICD-10-CM

## 2024-06-11 DIAGNOSIS — K80.50 CALCULUS OF BILE DUCT W/OUT CHOLANGITIS OR CHOLECYSTITIS W/OUT OBSTRUCTION: ICD-10-CM

## 2024-06-11 DIAGNOSIS — M25.511 PAIN IN RIGHT SHOULDER: ICD-10-CM

## 2024-06-11 DIAGNOSIS — Z13.820 ENCOUNTER FOR SCREENING FOR OSTEOPOROSIS: ICD-10-CM

## 2024-06-11 DIAGNOSIS — R73.02 IMPAIRED GLUCOSE TOLERANCE (ORAL): ICD-10-CM

## 2024-06-11 DIAGNOSIS — R79.89 OTHER SPECIFIED ABNORMAL FINDINGS OF BLOOD CHEMISTRY: ICD-10-CM

## 2024-06-11 DIAGNOSIS — Z01.818 ENCOUNTER FOR OTHER PREPROCEDURAL EXAMINATION: ICD-10-CM

## 2024-06-11 DIAGNOSIS — Z00.00 ENCOUNTER FOR GENERAL ADULT MEDICAL EXAMINATION W/OUT ABNORMAL FINDINGS: ICD-10-CM

## 2024-06-11 DIAGNOSIS — E03.9 HYPOTHYROIDISM, UNSPECIFIED: ICD-10-CM

## 2024-06-11 DIAGNOSIS — E55.9 VITAMIN D DEFICIENCY, UNSPECIFIED: ICD-10-CM

## 2024-06-11 DIAGNOSIS — E78.5 HYPERLIPIDEMIA, UNSPECIFIED: ICD-10-CM

## 2024-06-11 DIAGNOSIS — J84.10 PULMONARY FIBROSIS, UNSPECIFIED: ICD-10-CM

## 2024-06-11 PROCEDURE — G2211 COMPLEX E/M VISIT ADD ON: CPT | Mod: NC

## 2024-06-11 PROCEDURE — G0439: CPT

## 2024-06-11 PROCEDURE — 99213 OFFICE O/P EST LOW 20 MIN: CPT | Mod: 25

## 2024-06-11 RX ORDER — LEVOTHYROXINE SODIUM 0.03 MG/1
25 TABLET ORAL DAILY
Qty: 90 | Refills: 1 | Status: ACTIVE | COMMUNITY
Start: 2023-03-08 | End: 1900-01-01

## 2024-06-11 RX ORDER — DICLOFENAC SODIUM 3 G/100G
3 GEL TOPICAL TWICE DAILY
Qty: 1 | Refills: 5 | Status: DISCONTINUED | COMMUNITY
Start: 2024-01-04 | End: 2024-06-11

## 2024-06-11 NOTE — HISTORY OF PRESENT ILLNESS
[FreeTextEntry1] : CPE [de-identified] : vaccine- rec shingrix, tdap, prevnar 20 vac diet- healthy diet reviewed. exercise- no reviewed 6/8/24 , , Alk phos 175, - pt has no abd pain, nausea pt was taking herbal meds 10 days ago- stopped 5 days ago pt was seen by Surgeon, Dr. Nikita Terrell- Planning to do surg in [place]- pt's daughter states no Preop clearance is needed pt accompanied by her daughter, who served as historian.  elev LFT- reviewed 1/4/24 US abd- no evid of stone, CBD 12 mm- pt denies any abd pain, nausea, vomiting, fever reviewed 1/27/24 MRIRCP - common bile duct stone.  mod dilatation intrahepatic biliary ducts.  pt asympt now.  basilar pulm fibrosis- reviewed CT chest  7/8/23-3.8 cm thyroid mass, mild ILD, CBD stone, dilated CBD- rec FNA pulm fibrosis- reviewed Pulm note 3/4/24- bx  lipid- Patient is compliant with diet and medications.   IGT- diet reviewed w pt- pt made dietary chg.  - lost wgt- b/c she doesn't like food at [facility]  hypothyr- reviewed nl thyroid labs12/3023 obesity- using cane so limited ambulation thickening sigmoid- f/u w GI- Dr. Godinez- 1/25/24- rpt CT scan was done as per daughter- nl as per daughter GERD- controlled was tx with PPI- alt PPI   20 mg w pepcid but pt is not taking pepcid- as per pt , pt had EGD 2 yr ago by Dr. Godinez thyroid nodule - reviewed 4/19/21 US- had FNA by Endo- benign as per daughter. will f/u w endo- reviewed bx 5/28/21 follicular lesion of undetermined significance. reviewed 6/11/21 thyroid seq back pain- resolved R arm pain since 12/2023 since she had a fall and someone helped to lift her up hold her R arm- has appt w Ortho. - some limitation R arm

## 2024-06-11 NOTE — PHYSICAL EXAM
[No Acute Distress] : no acute distress [Well Nourished] : well nourished [Well Developed] : well developed [Well-Appearing] : well-appearing [Normal Sclera/Conjunctiva] : normal sclera/conjunctiva [PERRL] : pupils equal round and reactive to light [Normal Outer Ear/Nose] : the outer ears and nose were normal in appearance [Normal Oropharynx] : the oropharynx was normal [Normal TMs] : both tympanic membranes were normal [No Lymphadenopathy] : no lymphadenopathy [Supple] : supple [No Respiratory Distress] : no respiratory distress  [No Accessory Muscle Use] : no accessory muscle use [Normal Rate] : normal rate  [Regular Rhythm] : with a regular rhythm [Normal S1, S2] : normal S1 and S2 [No Murmur] : no murmur heard [No Carotid Bruits] : no carotid bruits [Pedal Pulses Present] : the pedal pulses are present [No Edema] : there was no peripheral edema [Normal Appearance] : normal in appearance [No Axillary Lymphadenopathy] : no axillary lymphadenopathy [Soft] : abdomen soft [Non Tender] : non-tender [Non-distended] : non-distended [No Masses] : no abdominal mass palpated [Normal Bowel Sounds] : normal bowel sounds [Grossly Normal Strength/Tone] : grossly normal strength/tone [No Focal Deficits] : no focal deficits [Normal Gait] : normal gait [Normal Affect] : the affect was normal [Normal Insight/Judgement] : insight and judgment were intact [de-identified] : thyroid enlarged [de-identified] : bibasilar rales [de-identified] : R shoulder pain - w flexion and abduction at 90

## 2024-06-11 NOTE — HEALTH RISK ASSESSMENT
[Good] : ~his/her~  mood as  good [Never (0 pts)] : Never (0 points) [No] : In the past 12 months have you used drugs other than those required for medical reasons? No [0] : 2) Feeling down, depressed, or hopeless: Not at all (0) [PHQ-2 Negative - No further assessment needed] : PHQ-2 Negative - No further assessment needed [Never] : Never [Patient reported mammogram was normal] : Patient reported mammogram was normal [Patient reported PAP Smear was normal] : Patient reported PAP Smear was normal [Patient reported bone density results were normal] : Patient reported bone density results were normal [Patient reported colonoscopy was abnormal] : Patient reported colonoscopy was abnormal [HIV test declined] : HIV test declined [Hepatitis C test declined] : Hepatitis C test declined [With Family] : lives with family [Retired] : retired [High School] : high school [] :  [Feels Safe at Home] : Feels safe at home [Fully functional (bathing, dressing, toileting, transferring, walking, feeding)] : Fully functional (bathing, dressing, toileting, transferring, walking, feeding) [Fully functional (using the telephone, shopping, preparing meals, housekeeping, doing laundry, using] : Fully functional and needs no help or supervision to perform IADLs (using the telephone, shopping, preparing meals, housekeeping, doing laundry, using transportation, managing medications and managing finances) [Smoke Detector] : smoke detector [Carbon Monoxide Detector] : carbon monoxide detector [Seat Belt] :  uses seat belt [Sunscreen] : uses sunscreen [With Patient/Caregiver] : , with patient/caregiver [Designated Healthcare Proxy] : Designated healthcare proxy [Relationship: ___] : Relationship: [unfilled] [One fall no injury in past year] : Patient reported one fall in the past year without injury [Audit-CScore] : 0 [de-identified] : 12/2023 [[id number]] : 0 [NO] : No [Change in mental status noted] : No change in mental status noted [None] : None [Sexually Active] : not sexually active [High Risk Behavior] : no high risk behavior [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 [Some assistance needed] : managing finances [Full assistance needed] : using transportation [Travel to Developing Areas] : does not  travel to developing areas [MammogramDate] : 01/18 [MammogramComments] : pt doesn't want any further mammo [PapSmearDate] : 01/18 [BoneDensityDate] : 04/21 [ColonoscopyDate] : 01/19 [ColonoscopyComments] : incomplete- diverticuli, polyp=- pt refused colonoscopy, EGD, cologuard [de-identified] : daughter and Son in law [FreeTextEntry2] : limited walking distance [FreeTextEntry4] : cooking mainly by her daughter but she helps w prep work [FreeTextEntry5] : some assistance w putting laundry in washing roberto [FreeTextEntry6] : daughter drives her [FreeTextEntry7] : daughter fills the pill box [FreeTextEntry8] : bills are payed by her daughter [de-identified] : uses hearing aid [de-identified] : see ophth a1mo ago [de-identified] : seen dentist 2020- sees in [place].  has dentures [AdvancecareDate] : 06/24

## 2024-06-11 NOTE — PLAN
[FreeTextEntry1] : elev LFT- avoid all supplement, over the counter meds. rpt labs next wk. if abd pain, n, v or jaundice, pt to go to the ER.  pt's daughter understand that I will be away and to f/u w her GI this wk

## 2024-08-06 ENCOUNTER — APPOINTMENT (OUTPATIENT)
Dept: INTERNAL MEDICINE | Facility: CLINIC | Age: 84
End: 2024-08-06

## 2024-08-06 PROCEDURE — G2211 COMPLEX E/M VISIT ADD ON: CPT

## 2024-08-06 PROCEDURE — 99214 OFFICE O/P EST MOD 30 MIN: CPT

## 2024-08-06 NOTE — PLAN
[FreeTextEntry1] : pt release records from GI- Dr. Xander Kaba 30  minutes was spent caring for the patient today.  This includes time spent before the visit reviewing the chart, time spent during the visit,  time spent counseling, and educating the patient on the disease course and it's treatment/ management and as well as time spent after the visit on documentation.

## 2024-08-06 NOTE — HISTORY OF PRESENT ILLNESS
[FreeTextEntry1] : bile duct stone, elev LFT [de-identified] :  reviewed 6/8/24 , , Alk phos 175, - pt has no abd pain, nausea pt was taking herbal meds 10 days ago- stopped 5 days ago pt was seen by Surgeon, Dr. Nikita Terrell-  ERCP was done- stones were removed but rpt ERCP is sharan for next wk.  added Uro pt accompanied by her daughter, who served as historian.  elev LFT- reviewed 1/4/24 US abd- no evid of stone, CBD 12 mm- pt denies any abd pain, nausea, vomiting, fever reviewed 1/27/24 MRCP - common bile duct stone.  mod dilatation intrahepatic biliary ducts.  pt asympt now.  basilar pulm fibrosis- reviewed CT chest  7/8/23-3.8 cm thyroid mass, mild ILD, CBD stone, dilated CBD- rec FNA pulm fibrosis- reviewed Pulm note 3/4/24- bx  lipid- Patient is compliant with diet and medications.   IGT- diet reviewed w pt- pt made dietary chg.  - lost wgt- b/c she doesn't like food at [facility]  hypothyr- reviewed nl thyroid labs 6/8/24 obesity- using cane so limited ambulation thickening sigmoid- f/u w GI- Dr. Godinez- 1/25/24- rpt CT scan was done as per daughter- nl as per daughter GERD- controlled was tx with PPI- alt PPI   20 mg w pepcid but pt is not taking pepcid- as per pt , pt had EGD 2 yr ago by Dr. Godinez thyroid nodule - reviewed 4/19/21 US- had FNA by Endo- benign as per daughter. will f/u w endo- reviewed bx 5/28/21 follicular lesion of undetermined significance. reviewed 6/11/21 thyroid seq- rec f/u w Endo- no longer working at [employer] R arm pain since 12/2023 since she had a fall and someone helped to lift her up hold her R arm- has appt w Ortho. - some limitation R arm- started PT  had cataract surgery- 2/2024- still having vision problems- pt has appt for 2nd opinion.

## 2024-08-06 NOTE — PHYSICAL EXAM
[TextEntry] : Constitutional: no acute distress, well nourished, well developed and well-appearing. Pulmonary: no respiratory distress, lungs - bibasilar rales, no accessory muscle use. Cardiac: normal rate, with a regular rhythm, normal S1 and S2 and no murmur heard.  Vascular: there was no peripheral edema. Abdomen: abdomen soft, non-tender, non-distended, no abdominal mass palpated and normal bowel sounds. Psychiatric: the affect was normal and insight and judgement were intact

## 2024-11-08 ENCOUNTER — APPOINTMENT (OUTPATIENT)
Dept: INTERNAL MEDICINE | Facility: CLINIC | Age: 84
End: 2024-11-08

## 2025-01-14 ENCOUNTER — TRANSCRIPTION ENCOUNTER (OUTPATIENT)
Age: 85
End: 2025-01-14

## 2025-01-19 LAB
ALBUMIN SERPL ELPH-MCNC: 4.1 G/DL
ALP BLD-CCNC: 92 U/L
ALT SERPL-CCNC: 14 U/L
ANION GAP SERPL CALC-SCNC: 11 MMOL/L
AST SERPL-CCNC: 20 U/L
BILIRUB SERPL-MCNC: 0.4 MG/DL
BUN SERPL-MCNC: 21 MG/DL
CALCIUM SERPL-MCNC: 9.5 MG/DL
CHLORIDE SERPL-SCNC: 106 MMOL/L
CHOLEST SERPL-MCNC: 180 MG/DL
CO2 SERPL-SCNC: 26 MMOL/L
CREAT SERPL-MCNC: 0.99 MG/DL
EGFR: 57 ML/MIN/1.73M2
ESTIMATED AVERAGE GLUCOSE: 120 MG/DL
GLUCOSE SERPL-MCNC: 105 MG/DL
HBA1C MFR BLD HPLC: 5.8 %
HDLC SERPL-MCNC: 53 MG/DL
LDLC SERPL CALC-MCNC: 84 MG/DL
NONHDLC SERPL-MCNC: 127 MG/DL
POTASSIUM SERPL-SCNC: 4.5 MMOL/L
PROT SERPL-MCNC: 7 G/DL
SODIUM SERPL-SCNC: 143 MMOL/L
T4 FREE SERPL-MCNC: 1 NG/DL
TRIGL SERPL-MCNC: 268 MG/DL
TSH SERPL-ACNC: 3.69 UIU/ML

## 2025-01-22 ENCOUNTER — NON-APPOINTMENT (OUTPATIENT)
Age: 85
End: 2025-01-22

## 2025-01-23 ENCOUNTER — APPOINTMENT (OUTPATIENT)
Dept: INTERNAL MEDICINE | Facility: CLINIC | Age: 85
End: 2025-01-23
Payer: MEDICARE

## 2025-01-23 VITALS
DIASTOLIC BLOOD PRESSURE: 82 MMHG | HEIGHT: 61 IN | SYSTOLIC BLOOD PRESSURE: 130 MMHG | WEIGHT: 190 LBS | OXYGEN SATURATION: 98 % | HEART RATE: 66 BPM | BODY MASS INDEX: 35.87 KG/M2

## 2025-01-23 DIAGNOSIS — Z87.898 PERSONAL HISTORY OF OTHER SPECIFIED CONDITIONS: ICD-10-CM

## 2025-01-23 DIAGNOSIS — E04.1 NONTOXIC SINGLE THYROID NODULE: ICD-10-CM

## 2025-01-23 DIAGNOSIS — K29.70 GASTRITIS, UNSPECIFIED, W/OUT BLEEDING: ICD-10-CM

## 2025-01-23 DIAGNOSIS — E78.5 HYPERLIPIDEMIA, UNSPECIFIED: ICD-10-CM

## 2025-01-23 DIAGNOSIS — E66.9 OBESITY, UNSPECIFIED: ICD-10-CM

## 2025-01-23 DIAGNOSIS — K80.50 CALCULUS OF BILE DUCT W/OUT CHOLANGITIS OR CHOLECYSTITIS W/OUT OBSTRUCTION: ICD-10-CM

## 2025-01-23 DIAGNOSIS — J84.10 PULMONARY FIBROSIS, UNSPECIFIED: ICD-10-CM

## 2025-01-23 DIAGNOSIS — E03.9 HYPOTHYROIDISM, UNSPECIFIED: ICD-10-CM

## 2025-01-23 DIAGNOSIS — R79.89 OTHER SPECIFIED ABNORMAL FINDINGS OF BLOOD CHEMISTRY: ICD-10-CM

## 2025-01-23 PROCEDURE — 99214 OFFICE O/P EST MOD 30 MIN: CPT

## 2025-01-23 PROCEDURE — G2211 COMPLEX E/M VISIT ADD ON: CPT

## 2025-02-08 ENCOUNTER — OUTPATIENT (OUTPATIENT)
Dept: OUTPATIENT SERVICES | Facility: HOSPITAL | Age: 85
LOS: 1 days | End: 2025-02-08
Payer: MEDICARE

## 2025-02-08 ENCOUNTER — APPOINTMENT (OUTPATIENT)
Dept: RADIOLOGY | Facility: IMAGING CENTER | Age: 85
End: 2025-02-08
Payer: MEDICARE

## 2025-02-08 DIAGNOSIS — Z98.890 OTHER SPECIFIED POSTPROCEDURAL STATES: Chronic | ICD-10-CM

## 2025-02-08 DIAGNOSIS — E66.9 OBESITY, UNSPECIFIED: ICD-10-CM

## 2025-02-08 DIAGNOSIS — Z90.49 ACQUIRED ABSENCE OF OTHER SPECIFIED PARTS OF DIGESTIVE TRACT: Chronic | ICD-10-CM

## 2025-02-08 PROCEDURE — 73030 X-RAY EXAM OF SHOULDER: CPT

## 2025-02-08 PROCEDURE — 73030 X-RAY EXAM OF SHOULDER: CPT | Mod: 26,RT

## 2025-06-13 ENCOUNTER — APPOINTMENT (OUTPATIENT)
Dept: INTERNAL MEDICINE | Facility: CLINIC | Age: 85
End: 2025-06-13
Payer: MEDICARE

## 2025-06-13 ENCOUNTER — NON-APPOINTMENT (OUTPATIENT)
Age: 85
End: 2025-06-13

## 2025-06-13 VITALS
DIASTOLIC BLOOD PRESSURE: 78 MMHG | HEIGHT: 61 IN | SYSTOLIC BLOOD PRESSURE: 163 MMHG | HEART RATE: 69 BPM | OXYGEN SATURATION: 97 % | BODY MASS INDEX: 37 KG/M2 | WEIGHT: 196 LBS

## 2025-06-13 PROBLEM — R20.0 NUMBNESS: Status: ACTIVE | Noted: 2025-06-13

## 2025-06-13 PROBLEM — R20.9 SENSATION OF COLD IN LOWER EXTREMITY: Status: ACTIVE | Noted: 2025-06-13

## 2025-06-13 PROBLEM — R94.31 ABNORMAL EKG: Status: ACTIVE | Noted: 2025-06-13

## 2025-06-13 PROCEDURE — 93000 ELECTROCARDIOGRAM COMPLETE: CPT

## 2025-06-13 PROCEDURE — 99213 OFFICE O/P EST LOW 20 MIN: CPT | Mod: 25

## 2025-06-13 PROCEDURE — G0439: CPT

## 2025-06-13 PROCEDURE — G2211 COMPLEX E/M VISIT ADD ON: CPT

## 2025-06-16 ENCOUNTER — TRANSCRIPTION ENCOUNTER (OUTPATIENT)
Age: 85
End: 2025-06-16

## 2025-06-16 LAB
25(OH)D3 SERPL-MCNC: 59.5 NG/ML
ALBUMIN SERPL ELPH-MCNC: 4.4 G/DL
ALP BLD-CCNC: 91 U/L
ALT SERPL-CCNC: 19 U/L
ANION GAP SERPL CALC-SCNC: 17 MMOL/L
AST SERPL-CCNC: 29 U/L
BASOPHILS # BLD AUTO: 0.07 K/UL
BASOPHILS NFR BLD AUTO: 1 %
BILIRUB SERPL-MCNC: 0.3 MG/DL
BUN SERPL-MCNC: 25 MG/DL
CALCIUM SERPL-MCNC: 9.6 MG/DL
CHLORIDE SERPL-SCNC: 105 MMOL/L
CHOLEST SERPL-MCNC: 205 MG/DL
CO2 SERPL-SCNC: 17 MMOL/L
CREAT SERPL-MCNC: 0.95 MG/DL
EGFRCR SERPLBLD CKD-EPI 2021: 59 ML/MIN/1.73M2
EOSINOPHIL # BLD AUTO: 0.15 K/UL
EOSINOPHIL NFR BLD AUTO: 2.1 %
ESTIMATED AVERAGE GLUCOSE: 123 MG/DL
GLUCOSE SERPL-MCNC: 107 MG/DL
HBA1C MFR BLD HPLC: 5.9 %
HCT VFR BLD CALC: 43.8 %
HDLC SERPL-MCNC: 54 MG/DL
HGB BLD-MCNC: 14.5 G/DL
IMM GRANULOCYTES NFR BLD AUTO: 0.4 %
LDLC SERPL-MCNC: 107 MG/DL
LYMPHOCYTES # BLD AUTO: 1.74 K/UL
LYMPHOCYTES NFR BLD AUTO: 24.7 %
MAN DIFF?: NORMAL
MCHC RBC-ENTMCNC: 31.2 PG
MCHC RBC-ENTMCNC: 33.1 G/DL
MCV RBC AUTO: 94.2 FL
MONOCYTES # BLD AUTO: 0.55 K/UL
MONOCYTES NFR BLD AUTO: 7.8 %
NEUTROPHILS # BLD AUTO: 4.51 K/UL
NEUTROPHILS NFR BLD AUTO: 64 %
NONHDLC SERPL-MCNC: 151 MG/DL
PLATELET # BLD AUTO: 161 K/UL
POTASSIUM SERPL-SCNC: 4.6 MMOL/L
PROT SERPL-MCNC: 7.2 G/DL
RBC # BLD: 4.65 M/UL
RBC # FLD: 12.5 %
SODIUM SERPL-SCNC: 139 MMOL/L
T4 FREE SERPL-MCNC: 0.8 NG/DL
TRIGL SERPL-MCNC: 259 MG/DL
TSH SERPL-ACNC: 3.12 UIU/ML
WBC # FLD AUTO: 7.05 K/UL

## 2025-06-26 ENCOUNTER — APPOINTMENT (OUTPATIENT)
Dept: ULTRASOUND IMAGING | Facility: CLINIC | Age: 85
End: 2025-06-26
Payer: MEDICARE

## 2025-06-26 ENCOUNTER — OUTPATIENT (OUTPATIENT)
Dept: OUTPATIENT SERVICES | Facility: HOSPITAL | Age: 85
LOS: 1 days | End: 2025-06-26
Payer: MEDICARE

## 2025-06-26 ENCOUNTER — APPOINTMENT (OUTPATIENT)
Dept: RADIOLOGY | Facility: CLINIC | Age: 85
End: 2025-06-26
Payer: MEDICARE

## 2025-06-26 DIAGNOSIS — Z98.890 OTHER SPECIFIED POSTPROCEDURAL STATES: Chronic | ICD-10-CM

## 2025-06-26 DIAGNOSIS — Z00.00 ENCOUNTER FOR GENERAL ADULT MEDICAL EXAMINATION WITHOUT ABNORMAL FINDINGS: ICD-10-CM

## 2025-06-26 DIAGNOSIS — Z90.49 ACQUIRED ABSENCE OF OTHER SPECIFIED PARTS OF DIGESTIVE TRACT: Chronic | ICD-10-CM

## 2025-06-26 PROCEDURE — 93925 LOWER EXTREMITY STUDY: CPT

## 2025-06-26 PROCEDURE — 93925 LOWER EXTREMITY STUDY: CPT | Mod: 26

## 2025-06-26 PROCEDURE — 77080 DXA BONE DENSITY AXIAL: CPT | Mod: 26

## 2025-06-26 PROCEDURE — 77080 DXA BONE DENSITY AXIAL: CPT

## 2025-07-01 ENCOUNTER — TRANSCRIPTION ENCOUNTER (OUTPATIENT)
Age: 85
End: 2025-07-01

## 2025-07-02 ENCOUNTER — TRANSCRIPTION ENCOUNTER (OUTPATIENT)
Age: 85
End: 2025-07-02

## 2025-07-03 ENCOUNTER — NON-APPOINTMENT (OUTPATIENT)
Age: 85
End: 2025-07-03